# Patient Record
Sex: FEMALE | Race: WHITE | NOT HISPANIC OR LATINO | Employment: OTHER | URBAN - METROPOLITAN AREA
[De-identification: names, ages, dates, MRNs, and addresses within clinical notes are randomized per-mention and may not be internally consistent; named-entity substitution may affect disease eponyms.]

---

## 2019-01-24 ENCOUNTER — HOSPITAL ENCOUNTER (OUTPATIENT)
Dept: NUCLEAR MEDICINE | Facility: HOSPITAL | Age: 59
Discharge: HOME | End: 2019-01-24
Attending: FAMILY MEDICINE | Admitting: FAMILY MEDICINE

## 2019-07-19 ENCOUNTER — HOSPITAL ENCOUNTER (EMERGENCY)
Facility: HOSPITAL | Age: 59
Discharge: HOME | End: 2019-07-19
Admitting: EMERGENCY MEDICINE

## 2019-07-19 VITALS
HEART RATE: 58 BPM | OXYGEN SATURATION: 99 % | SYSTOLIC BLOOD PRESSURE: 163 MMHG | DIASTOLIC BLOOD PRESSURE: 82 MMHG | HEIGHT: 65 IN | BODY MASS INDEX: 31.74 KG/M2 | TEMPERATURE: 98.4 F | RESPIRATION RATE: 16 BRPM | WEIGHT: 190.48 LBS

## 2019-07-19 DIAGNOSIS — M54.32 SCIATICA OF LEFT SIDE: Primary | ICD-10-CM

## 2019-07-19 PROCEDURE — 25010000002 KETOROLAC TROMETHAMINE PER 15 MG: Performed by: PHYSICIAN ASSISTANT

## 2019-07-19 PROCEDURE — 99283 EMERGENCY DEPT VISIT LOW MDM: CPT

## 2019-07-19 PROCEDURE — 96372 THER/PROPH/DIAG INJ SC/IM: CPT

## 2019-07-19 PROCEDURE — 25010000002 METHYLPREDNISOLONE PER 125 MG: Performed by: PHYSICIAN ASSISTANT

## 2019-07-19 RX ORDER — LIDOCAINE 50 MG/G
1 PATCH TOPICAL ONCE
Status: COMPLETED | OUTPATIENT
Start: 2019-07-19 | End: 2019-07-19

## 2019-07-19 RX ORDER — KETOROLAC TROMETHAMINE 30 MG/ML
30 INJECTION, SOLUTION INTRAMUSCULAR; INTRAVENOUS ONCE
Status: COMPLETED | OUTPATIENT
Start: 2019-07-19 | End: 2019-07-19

## 2019-07-19 RX ORDER — DICLOFENAC SODIUM 75 MG/1
75 TABLET, DELAYED RELEASE ORAL 2 TIMES DAILY
Qty: 60 TABLET | Refills: 0 | Status: SHIPPED | OUTPATIENT
Start: 2019-07-19 | End: 2021-06-16 | Stop reason: SDUPTHER

## 2019-07-19 RX ORDER — LISINOPRIL AND HYDROCHLOROTHIAZIDE 20; 12.5 MG/1; MG/1
1 TABLET ORAL DAILY
COMMUNITY
End: 2021-06-16 | Stop reason: SDUPTHER

## 2019-07-19 RX ORDER — METHOCARBAMOL 750 MG/1
750 TABLET, FILM COATED ORAL 3 TIMES DAILY
Qty: 45 TABLET | Refills: 0 | Status: SHIPPED | OUTPATIENT
Start: 2019-07-19 | End: 2021-06-16 | Stop reason: SDUPTHER

## 2019-07-19 RX ORDER — ESOMEPRAZOLE MAGNESIUM 40 MG/1
40 CAPSULE, DELAYED RELEASE ORAL
COMMUNITY

## 2019-07-19 RX ORDER — ATORVASTATIN CALCIUM 20 MG/1
20 TABLET, FILM COATED ORAL DAILY
COMMUNITY
End: 2021-06-16 | Stop reason: SDUPTHER

## 2019-07-19 RX ORDER — BUSPIRONE HYDROCHLORIDE 10 MG/1
7.5 TABLET ORAL 2 TIMES DAILY
COMMUNITY
End: 2021-06-16 | Stop reason: SDUPTHER

## 2019-07-19 RX ORDER — CETIRIZINE HYDROCHLORIDE 10 MG/1
10 TABLET ORAL DAILY
COMMUNITY

## 2019-07-19 RX ORDER — METHOCARBAMOL 500 MG/1
500 TABLET, FILM COATED ORAL ONCE
Status: COMPLETED | OUTPATIENT
Start: 2019-07-19 | End: 2019-07-19

## 2019-07-19 RX ORDER — METHYLPREDNISOLONE 4 MG/1
TABLET ORAL
Qty: 21 TABLET | Refills: 0 | Status: SHIPPED | OUTPATIENT
Start: 2019-07-19 | End: 2021-06-16 | Stop reason: SDUPTHER

## 2019-07-19 RX ORDER — LIDOCAINE 50 MG/G
1 PATCH TOPICAL EVERY 24 HOURS
Qty: 30 PATCH | Refills: 0 | Status: SHIPPED | OUTPATIENT
Start: 2019-07-19 | End: 2021-06-16 | Stop reason: SDUPTHER

## 2019-07-19 RX ORDER — METHYLPREDNISOLONE SODIUM SUCCINATE 125 MG/2ML
125 INJECTION, POWDER, LYOPHILIZED, FOR SOLUTION INTRAMUSCULAR; INTRAVENOUS ONCE
Status: COMPLETED | OUTPATIENT
Start: 2019-07-19 | End: 2019-07-19

## 2019-07-19 RX ADMIN — METHOCARBAMOL 500 MG: 500 TABLET ORAL at 10:10

## 2019-07-19 RX ADMIN — LIDOCAINE 1 PATCH: 50 PATCH CUTANEOUS at 10:24

## 2019-07-19 RX ADMIN — LIDOCAINE 1 PATCH: 50 PATCH CUTANEOUS at 09:56

## 2019-07-19 RX ADMIN — KETOROLAC TROMETHAMINE 30 MG: 30 INJECTION, SOLUTION INTRAMUSCULAR at 10:10

## 2019-07-19 RX ADMIN — METHYLPREDNISOLONE SODIUM SUCCINATE 125 MG: 125 INJECTION, POWDER, FOR SOLUTION INTRAMUSCULAR; INTRAVENOUS at 10:10

## 2020-01-05 ENCOUNTER — HOSPITAL ENCOUNTER (EMERGENCY)
Facility: HOSPITAL | Age: 60
Discharge: HOME | End: 2020-01-05
Admitting: EMERGENCY MEDICINE

## 2020-01-05 VITALS
WEIGHT: 194 LBS | TEMPERATURE: 98 F | DIASTOLIC BLOOD PRESSURE: 86 MMHG | RESPIRATION RATE: 16 BRPM | BODY MASS INDEX: 32.32 KG/M2 | HEIGHT: 65 IN | OXYGEN SATURATION: 97 % | SYSTOLIC BLOOD PRESSURE: 153 MMHG | HEART RATE: 74 BPM

## 2020-01-05 DIAGNOSIS — L03.211 CELLULITIS OF FACE: Primary | ICD-10-CM

## 2020-01-05 PROCEDURE — 99282 EMERGENCY DEPT VISIT SF MDM: CPT

## 2020-01-05 RX ORDER — CEPHALEXIN 500 MG/1
500 CAPSULE ORAL 3 TIMES DAILY
Qty: 30 CAPSULE | Refills: 0 | Status: SHIPPED | OUTPATIENT
Start: 2020-01-05 | End: 2021-06-16 | Stop reason: SDUPTHER

## 2020-01-05 NOTE — ED PROVIDER NOTES
"Subjective   59-year-old female presents with \"bug bite\" to her right lower chin and below her right ear.  She denies fever sweats or chills.  Denies history of diabetes.  Denies recent antibiotic use.  She stated \"I had put my pillows on the floor while is make my bed and then put them behind me to watch something on TV and then they showed up yesterday\"    1. Location: R chin, R upper jaw  2. Quality: sore  3. Severity: mild  4. Worsening factors: scratching  5. Alleviating factors: not scratching  6. Onset: yesterday  7. Radiation: denies  8. Frequency: constant  9. Co-morbidities: asthma, DDD, HTN, scoliosis  10. Source: patient            Review of Systems   Constitutional: Negative for chills, diaphoresis and fever.   HENT: Negative for congestion, drooling, ear discharge, rhinorrhea, sneezing, sore throat, trouble swallowing and voice change.    Eyes: Negative for redness.   Respiratory: Negative for cough, choking, chest tightness, shortness of breath, wheezing and stridor.    Gastrointestinal: Negative for abdominal pain, nausea and vomiting.   Musculoskeletal: Negative for arthralgias, joint swelling, myalgias and neck pain.   Skin: Positive for rash. Negative for color change, pallor and wound.   Allergic/Immunologic: Negative for environmental allergies, food allergies and immunocompromised state.   Neurological: Negative for headaches.   Hematological: Negative for adenopathy.   All other systems reviewed and are negative.      Past Medical History:   Diagnosis Date   • Asthma    • DDD (degenerative disc disease), lumbar    • Hypertension    • Scoliosis    • Sleep apnea        Allergies   Allergen Reactions   • Iodine Anaphylaxis   • Bactrim [Sulfamethoxazole-Trimethoprim] Unknown - Low Severity   • Beeswax Swelling   • Sulfa Antibiotics Swelling       Past Surgical History:   Procedure Laterality Date   • EXPLORATORY LAPAROTOMY     • EYE SURGERY     • HYSTERECTOMY         No family history on " file.    Social History     Socioeconomic History   • Marital status:      Spouse name: Not on file   • Number of children: Not on file   • Years of education: Not on file   • Highest education level: Not on file   Tobacco Use   • Smoking status: Current Every Day Smoker     Packs/day: 0.50     Types: Cigarettes   Substance and Sexual Activity   • Alcohol use: Yes     Frequency: Never     Comment: occasional   • Sexual activity: Never           Objective   Physical Exam   Constitutional: She is oriented to person, place, and time. Vital signs are normal. She appears well-developed and well-nourished. She is active and cooperative.  Non-toxic appearance. No distress.   HENT:   Head: Normocephalic and atraumatic.       Right Ear: Hearing, tympanic membrane, external ear and ear canal normal.   Left Ear: Hearing, tympanic membrane, external ear and ear canal normal.   Nose: Nose normal.   Mouth/Throat: Uvula is midline, oropharynx is clear and moist and mucous membranes are normal. Tonsils are 1+ on the right. Tonsils are 1+ on the left. No tonsillar exudate.   Neck: Trachea normal, normal range of motion, full passive range of motion without pain and phonation normal. Neck supple. No Brudzinski's sign and no Kernig's sign noted.   Cardiovascular: Normal rate, regular rhythm, normal heart sounds and intact distal pulses. Exam reveals no gallop and no friction rub.   No murmur heard.  Pulmonary/Chest: Effort normal and breath sounds normal. No respiratory distress. She has no wheezes.   Lymphadenopathy:     She has no cervical adenopathy.   Neurological: She is alert and oriented to person, place, and time.   Skin: Skin is warm, dry and intact. Capillary refill takes less than 2 seconds. Lesion and rash noted. Rash is maculopapular.   Psychiatric: She has a normal mood and affect. Her behavior is normal. Judgment and thought content normal.   Nursing note and vitals reviewed.      Procedures           ED Course    "   No radiology results for the last day  Medications - No data to display  Labs Reviewed - No data to display                                           MDM  Number of Diagnoses or Management Options  Cellulitis of face:   Diagnosis management comments: Chart Review: 12/13/2019 patient was seen by spine for follow-up on her DDD.  Comorbidity: asthma, DDD, HTN, scoliosis  Imaging: Not warranted.  Disposition/Treatment: Discussed results with patient, verbalized understanding.  Agreeable with plan of care.    Patient undressed and placed in gown for exam. 59-year-old female presents with \"bug bite\" to her right lower chin and below her right ear.  She denies fever sweats or chills.  Denies history of diabetes.  Denies recent antibiotic use.  She stated \"I had put my pillows on the floor while is make my bed and then put them behind me to watch something on TV and then they showed up yesterday\".  Patient is nontoxic.  She is discharged home with prescription for Keflex and given follow-up with her PCP in 3 days for wound recheck.  She is instructed to apply heat every 2 hours while awake and cleanse the area twice daily with antibacterial soap and water then apply bacitracin.  She is encouraged to return to the ER for any new or worsening symptoms.        Patient Progress  Patient progress: stable      Final diagnoses:   Cellulitis of face            Janis Black NP  01/05/20 0756    "

## 2020-01-05 NOTE — DISCHARGE INSTRUCTIONS
Take antibiotics as prescribed with food.  Cleanse twice daily with antibacterial soap and water, then apply bacitracin.  Warm compresses every 2 hours while awake, on for 20 minutes.  Okay to be open to air.  Follow-up with PCP in 3 days for wound check.  Return to the ER for any new or worsening symptoms.

## 2020-03-07 ENCOUNTER — HOSPITAL ENCOUNTER (EMERGENCY)
Facility: HOSPITAL | Age: 60
Discharge: HOME | End: 2020-03-07
Admitting: EMERGENCY MEDICINE

## 2020-03-07 ENCOUNTER — APPOINTMENT (OUTPATIENT)
Dept: GENERAL RADIOLOGY | Facility: HOSPITAL | Age: 60
End: 2020-03-07

## 2020-03-07 VITALS
OXYGEN SATURATION: 95 % | TEMPERATURE: 99.3 F | BODY MASS INDEX: 31.92 KG/M2 | RESPIRATION RATE: 19 BRPM | SYSTOLIC BLOOD PRESSURE: 104 MMHG | HEART RATE: 80 BPM | HEIGHT: 65 IN | DIASTOLIC BLOOD PRESSURE: 48 MMHG | WEIGHT: 191.58 LBS

## 2020-03-07 DIAGNOSIS — J20.9 ACUTE BRONCHITIS, UNSPECIFIED ORGANISM: Primary | ICD-10-CM

## 2020-03-07 DIAGNOSIS — R06.00 DYSPNEA, UNSPECIFIED TYPE: ICD-10-CM

## 2020-03-07 LAB
ANION GAP SERPL CALCULATED.3IONS-SCNC: 12 MMOL/L (ref 5–15)
BUN BLD-MCNC: 20 MG/DL (ref 6–20)
BUN/CREAT SERPL: 26 (ref 7–25)
CALCIUM SPEC-SCNC: 9.5 MG/DL (ref 8.6–10.5)
CHLORIDE SERPL-SCNC: 100 MMOL/L (ref 98–107)
CO2 SERPL-SCNC: 26 MMOL/L (ref 22–29)
CREAT BLD-MCNC: 0.77 MG/DL (ref 0.57–1)
DEPRECATED RDW RBC AUTO: 45.1 FL (ref 37–54)
EOSINOPHIL # BLD MANUAL: 0.17 10*3/MM3 (ref 0–0.4)
EOSINOPHIL NFR BLD MANUAL: 2 % (ref 0.3–6.2)
ERYTHROCYTE [DISTWIDTH] IN BLOOD BY AUTOMATED COUNT: 14.1 % (ref 12.3–15.4)
FLUAV SUBTYP SPEC NAA+PROBE: NOT DETECTED
FLUBV RNA ISLT QL NAA+PROBE: NOT DETECTED
GFR SERPL CREATININE-BSD FRML MDRD: 77 ML/MIN/1.73
GLUCOSE BLD-MCNC: 130 MG/DL (ref 65–99)
HCT VFR BLD AUTO: 38.3 % (ref 34–46.6)
HGB BLD-MCNC: 13.1 G/DL (ref 12–15.9)
LYMPHOCYTES # BLD MANUAL: 1 10*3/MM3 (ref 0.7–3.1)
LYMPHOCYTES NFR BLD MANUAL: 12 % (ref 19.6–45.3)
LYMPHOCYTES NFR BLD MANUAL: 6 % (ref 5–12)
MCH RBC QN AUTO: 30.6 PG (ref 26.6–33)
MCHC RBC AUTO-ENTMCNC: 34.1 G/DL (ref 31.5–35.7)
MCV RBC AUTO: 89.7 FL (ref 79–97)
MONOCYTES # BLD AUTO: 0.5 10*3/MM3 (ref 0.1–0.9)
NEUTROPHILS # BLD AUTO: 6.64 10*3/MM3 (ref 1.7–7)
NEUTROPHILS NFR BLD MANUAL: 72 % (ref 42.7–76)
NEUTS BAND NFR BLD MANUAL: 8 % (ref 0–5)
NEUTS VAC BLD QL SMEAR: ABNORMAL
PLAT MORPH BLD: NORMAL
PLATELET # BLD AUTO: 244 10*3/MM3 (ref 140–450)
PMV BLD AUTO: 8.8 FL (ref 6–12)
POTASSIUM BLD-SCNC: 3.6 MMOL/L (ref 3.5–5.2)
RBC # BLD AUTO: 4.27 10*6/MM3 (ref 3.77–5.28)
RBC MORPH BLD: NORMAL
SCAN SLIDE: NORMAL
SODIUM BLD-SCNC: 138 MMOL/L (ref 136–145)
TROPONIN T SERPL-MCNC: <0.01 NG/ML (ref 0–0.03)
WBC NRBC COR # BLD: 8.3 10*3/MM3 (ref 3.4–10.8)

## 2020-03-07 PROCEDURE — 85025 COMPLETE CBC W/AUTO DIFF WBC: CPT | Performed by: NURSE PRACTITIONER

## 2020-03-07 PROCEDURE — 93005 ELECTROCARDIOGRAM TRACING: CPT

## 2020-03-07 PROCEDURE — 85007 BL SMEAR W/DIFF WBC COUNT: CPT | Performed by: NURSE PRACTITIONER

## 2020-03-07 PROCEDURE — 80048 BASIC METABOLIC PNL TOTAL CA: CPT | Performed by: NURSE PRACTITIONER

## 2020-03-07 PROCEDURE — 71045 X-RAY EXAM CHEST 1 VIEW: CPT

## 2020-03-07 PROCEDURE — 84484 ASSAY OF TROPONIN QUANT: CPT | Performed by: NURSE PRACTITIONER

## 2020-03-07 PROCEDURE — 99284 EMERGENCY DEPT VISIT MOD MDM: CPT

## 2020-03-07 PROCEDURE — 87502 INFLUENZA DNA AMP PROBE: CPT | Performed by: NURSE PRACTITIONER

## 2020-03-07 RX ORDER — SODIUM CHLORIDE 0.9 % (FLUSH) 0.9 %
10 SYRINGE (ML) INJECTION AS NEEDED
Status: DISCONTINUED | OUTPATIENT
Start: 2020-03-07 | End: 2020-03-07 | Stop reason: HOSPADM

## 2020-03-07 RX ORDER — METHYLPREDNISOLONE 4 MG/1
TABLET ORAL
Qty: 21 TABLET | Refills: 0 | Status: SHIPPED | OUTPATIENT
Start: 2020-03-07 | End: 2021-06-16 | Stop reason: SDUPTHER

## 2020-03-07 RX ORDER — BENZONATATE 200 MG/1
200 CAPSULE ORAL 3 TIMES DAILY PRN
Qty: 15 CAPSULE | Refills: 0 | Status: SHIPPED | OUTPATIENT
Start: 2020-03-07 | End: 2021-06-16 | Stop reason: SDUPTHER

## 2020-03-07 RX ORDER — ALBUTEROL SULFATE 2.5 MG/3ML
2.5 SOLUTION RESPIRATORY (INHALATION) EVERY 4 HOURS PRN
Qty: 3 ML | Refills: 0 | Status: SHIPPED | OUTPATIENT
Start: 2020-03-07 | End: 2021-06-16 | Stop reason: SDUPTHER

## 2020-03-07 NOTE — ED PROVIDER NOTES
Subjective   Patient is a 60-year-old white female with history of asthma who presents today with complaints of nonproductive cough, sore throat, nasal congestion and chest tightness.  States she is also running low-grade fever this morning.  She denies any chills or body aches.  She states her symptoms started yesterday morning. She reports her cough to be hacking and nonproductive.  States the cough is causing her to feel some short of breath.  She denies any chest pain.  She denies any nausea vomiting or diarrhea.  She denies any lower extremity pain or edema. She denies any ill contacts or recent travel.          Review of Systems   Constitutional: Positive for fever. Negative for chills.   HENT: Positive for congestion, postnasal drip and sore throat.    Respiratory: Positive for cough, chest tightness and shortness of breath.    Cardiovascular: Negative for chest pain.   Gastrointestinal: Negative for abdominal pain, diarrhea, nausea and vomiting.   Skin: Negative for rash.       Past Medical History:   Diagnosis Date   • Asthma    • DDD (degenerative disc disease), lumbar    • Hypertension    • Scoliosis    • Sleep apnea        Allergies   Allergen Reactions   • Iodine Anaphylaxis   • Bactrim [Sulfamethoxazole-Trimethoprim] Unknown - Low Severity   • Beeswax Swelling   • Sulfa Antibiotics Swelling       Past Surgical History:   Procedure Laterality Date   • EXPLORATORY LAPAROTOMY     • EYE SURGERY     • HYSTERECTOMY         No family history on file.    Social History     Socioeconomic History   • Marital status:      Spouse name: Not on file   • Number of children: Not on file   • Years of education: Not on file   • Highest education level: Not on file   Tobacco Use   • Smoking status: Current Every Day Smoker     Packs/day: 0.50     Types: Cigarettes   Substance and Sexual Activity   • Alcohol use: Yes     Frequency: Never     Comment: occasional   • Sexual activity: Never           Objective    Physical Exam   Constitutional: She appears well-developed.   Vital signs and triage nurse note reviewed.  Constitutional: Awake, alert; well-developed and well-nourished. No acute distress is noted.  HEENT: Normocephalic, atraumatic; pupils are PERRL with intact EOM; oropharynx is pink and moist without exudate or erythema.  No drooling or pooling of oral secretions.  Neck: Supple, full range of motion without pain; no cervical lymphadenopathy. Normal phonation.  Cardiovascular: Regular rate and rhythm, normal S1-S2.  No murmur noted.  Pulmonary: Respiratory effort regular nonlabored, breath sounds clear to auscultation all fields.  Abdomen: Soft, nontender, nondistended with normoactive bowel sounds; no rebound or guarding.  Calves are symmetric and nontender.  Musculoskeletal: Independent range of motion of all extremities with no palpable tenderness or edema.  Neuro: Alert oriented x3, speech is clear and appropriate, GCS 15.    Skin: Flesh tone, warm, dry, intact; no erythematous or petechial rash or lesion.        Procedures           ED Course  ED Course as of Mar 07 0837   Sat Mar 07, 2020   0730 EKG reviewed by me and interpreted by Dr. Curtis: Sinus rhythm with ventricular rate of 84.  No acute ST or T wave changes noted.  No ectopy.  No significant change from prior EKG on 8/24/2018.    [MD]      ED Course User Index  [MD] Amy Hernandez APRN      Labs Reviewed   BASIC METABOLIC PANEL - Abnormal; Notable for the following components:       Result Value    Glucose 130 (*)     BUN/Creatinine Ratio 26.0 (*)     All other components within normal limits    Narrative:     GFR Normal >60  Chronic Kidney Disease <60  Kidney Failure <15     MANUAL DIFFERENTIAL - Abnormal; Notable for the following components:    Lymphocyte % 12.0 (*)     Bands %  8.0 (*)     All other components within normal limits   INFLUENZA ANTIGEN, RAPID - Normal   TROPONIN (IN-HOUSE) - Normal    Narrative:     Troponin T Reference  Range:  <= 0.03 ng/mL-   Negative for AMI  >0.03 ng/mL-     Abnormal for myocardial necrosis.  Clinicians would have to utilize clinical acumen, EKG, Troponin and serial changes to determine if it is an Acute Myocardial Infarction or myocardial injury due to an underlying chronic condition.       Results may be falsely decreased if patient taking Biotin.     CBC WITH AUTO DIFFERENTIAL - Normal   SCAN SLIDE   CBC AND DIFFERENTIAL    Narrative:     The following orders were created for panel order CBC & Differential.  Procedure                               Abnormality         Status                     ---------                               -----------         ------                     CBC Auto Differential[[id number]]        Normal              Final result                 Please view results for these tests on the individual orders.     Xr Chest 1 View    Result Date: 3/7/2020   1. No acute cardiopulmonary disease.   Electronically Signed By-Mario Wallis On:3/7/2020 8:10 AM This report was finalized on [id number] by  Mario Wallis, .    Medications   sodium chloride 0.9 % flush 10 mL (has no administration in time range)                                          MDM  Number of Diagnoses or Management Options  Diagnosis management comments: Comorbidities: Asthma  Differentials: Asthma exacerbation, bronchitis, influenza, pneumonia, viral illness;this list is not all inclusive and does not constitute the entirety of considered causes  Discussion with provider:  Radiology interpretation: X-rays reviewed by me and interpreted by radiologist: As above  Lab interpretation: Labs viewed by me significant for: As above    Patient had an IV established.  She is placed on continuous cardiac monitor.  She had labs, EKG and chest x-ray obtained.  On reexamination, patient is resting comfortably no acute distress.  Denies any new complaints at this time.  She remains well-appearing and in no respiratory distress.  Her vital  signs are stable.  Her sounds are clear and equal bilaterally.    Diagnosis and treatment plan discussed with patient.  Patient agreeable to plan.   I discussed findings with patient who voices understanding of discharge instructions, signs and symptoms requiring return to ED; discharged improved and in stable condition with follow up for re-evaluation.  Prescription for Medrol Dosepak, Tessalon, albuterol Nebules.       Amount and/or Complexity of Data Reviewed  Clinical lab tests: ordered and reviewed  Tests in the radiology section of CPT®: ordered and reviewed    Patient Progress  Patient progress: stable      Final diagnoses:   Acute bronchitis, unspecified organism   Dyspnea, unspecified type            Amy Hernandez, TANESHA  03/07/20 0837

## 2020-03-07 NOTE — ED NOTES
Pt reports hx of asthma started with a cough and chest tightness yesterday, denies any other s/sx pt is 88% on roomair, pt placed on 2L of oxygen and is now above 94%     Chantal Steele RN  03/07/20 7889

## 2020-03-07 NOTE — DISCHARGE INSTRUCTIONS
Take medications as prescribed.  Drink plenty fluids.  May use Mucinex for congestion.  Follow-up with your primary care physician as needed.  Return for new or worsening symptoms.

## 2020-03-07 NOTE — ED NOTES
Pt is tolerating 2L of oxygen well, I attempted iv twice was unable to obtain both tube, Farhana SNYDER at bedside now attempting.      Chantal Steele, RN  03/07/20 6737

## 2022-02-15 ENCOUNTER — APPOINTMENT (OUTPATIENT)
Dept: GENERAL RADIOLOGY | Facility: HOSPITAL | Age: 62
End: 2022-02-15

## 2022-02-15 ENCOUNTER — APPOINTMENT (OUTPATIENT)
Dept: CARDIOLOGY | Facility: HOSPITAL | Age: 62
End: 2022-02-15

## 2022-02-15 ENCOUNTER — HOSPITAL ENCOUNTER (OUTPATIENT)
Facility: HOSPITAL | Age: 62
Setting detail: OBSERVATION
Discharge: HOME | End: 2022-02-16
Attending: EMERGENCY MEDICINE | Admitting: EMERGENCY MEDICINE

## 2022-02-15 DIAGNOSIS — F41.9 ANXIETY: ICD-10-CM

## 2022-02-15 DIAGNOSIS — R07.9 CHEST PAIN, UNSPECIFIED TYPE: Primary | ICD-10-CM

## 2022-02-15 LAB
ALBUMIN SERPL-MCNC: 4.4 G/DL (ref 3.5–5.2)
ALBUMIN/GLOB SERPL: 1.8 G/DL
ALP SERPL-CCNC: 75 U/L (ref 39–117)
ALT SERPL W P-5'-P-CCNC: 37 U/L (ref 1–33)
ANION GAP SERPL CALCULATED.3IONS-SCNC: 12 MMOL/L (ref 5–15)
APTT PPP: 24.7 SECONDS (ref 24–31)
AST SERPL-CCNC: 22 U/L (ref 1–32)
BASOPHILS # BLD AUTO: 0.1 10*3/MM3 (ref 0–0.2)
BASOPHILS NFR BLD AUTO: 1.3 % (ref 0–1.5)
BH CV UPPER VENOUS LEFT AXILLARY AUGMENT: NORMAL
BH CV UPPER VENOUS LEFT AXILLARY COMPETENT: NORMAL
BH CV UPPER VENOUS LEFT AXILLARY COMPRESS: NORMAL
BH CV UPPER VENOUS LEFT AXILLARY PHASIC: NORMAL
BH CV UPPER VENOUS LEFT AXILLARY SPONT: NORMAL
BH CV UPPER VENOUS LEFT BASILIC FOREARM COMPRESS: NORMAL
BH CV UPPER VENOUS LEFT BASILIC UPPER COMPRESS: NORMAL
BH CV UPPER VENOUS LEFT BRACHIAL COMPRESS: NORMAL
BH CV UPPER VENOUS LEFT CEPHALIC FOREARM COMPRESS: NORMAL
BH CV UPPER VENOUS LEFT CEPHALIC UPPER COMPRESS: NORMAL
BH CV UPPER VENOUS LEFT INTERNAL JUGULAR AUGMENT: NORMAL
BH CV UPPER VENOUS LEFT INTERNAL JUGULAR COMPETENT: NORMAL
BH CV UPPER VENOUS LEFT INTERNAL JUGULAR COMPRESS: NORMAL
BH CV UPPER VENOUS LEFT INTERNAL JUGULAR PHASIC: NORMAL
BH CV UPPER VENOUS LEFT INTERNAL JUGULAR SPONT: NORMAL
BH CV UPPER VENOUS LEFT RADIAL COMPRESS: NORMAL
BH CV UPPER VENOUS LEFT SUBCLAVIAN AUGMENT: NORMAL
BH CV UPPER VENOUS LEFT SUBCLAVIAN COMPETENT: NORMAL
BH CV UPPER VENOUS LEFT SUBCLAVIAN COMPRESS: NORMAL
BH CV UPPER VENOUS LEFT SUBCLAVIAN PHASIC: NORMAL
BH CV UPPER VENOUS LEFT SUBCLAVIAN SPONT: NORMAL
BH CV UPPER VENOUS LEFT ULNAR COMPRESS: NORMAL
BH CV UPPER VENOUS RIGHT INTERNAL JUGULAR AUGMENT: NORMAL
BH CV UPPER VENOUS RIGHT INTERNAL JUGULAR COMPETENT: NORMAL
BH CV UPPER VENOUS RIGHT INTERNAL JUGULAR COMPRESS: NORMAL
BH CV UPPER VENOUS RIGHT INTERNAL JUGULAR PHASIC: NORMAL
BH CV UPPER VENOUS RIGHT INTERNAL JUGULAR SPONT: NORMAL
BH CV UPPER VENOUS RIGHT SUBCLAVIAN AUGMENT: NORMAL
BH CV UPPER VENOUS RIGHT SUBCLAVIAN COMPETENT: NORMAL
BH CV UPPER VENOUS RIGHT SUBCLAVIAN COMPRESS: NORMAL
BH CV UPPER VENOUS RIGHT SUBCLAVIAN PHASIC: NORMAL
BH CV UPPER VENOUS RIGHT SUBCLAVIAN SPONT: NORMAL
BILIRUB SERPL-MCNC: 0.5 MG/DL (ref 0–1.2)
BUN SERPL-MCNC: 8 MG/DL (ref 8–23)
BUN/CREAT SERPL: 11.8 (ref 7–25)
CALCIUM SPEC-SCNC: 9.4 MG/DL (ref 8.6–10.5)
CHLORIDE SERPL-SCNC: 97 MMOL/L (ref 98–107)
CO2 SERPL-SCNC: 26 MMOL/L (ref 22–29)
CREAT SERPL-MCNC: 0.68 MG/DL (ref 0.57–1)
DEPRECATED RDW RBC AUTO: 40.7 FL (ref 37–54)
EOSINOPHIL # BLD AUTO: 0.1 10*3/MM3 (ref 0–0.4)
EOSINOPHIL NFR BLD AUTO: 0.8 % (ref 0.3–6.2)
ERYTHROCYTE [DISTWIDTH] IN BLOOD BY AUTOMATED COUNT: 13.1 % (ref 12.3–15.4)
GFR SERPL CREATININE-BSD FRML MDRD: 88 ML/MIN/1.73
GLOBULIN UR ELPH-MCNC: 2.5 GM/DL
GLUCOSE SERPL-MCNC: 114 MG/DL (ref 65–99)
HCT VFR BLD AUTO: 37.9 % (ref 34–46.6)
HGB BLD-MCNC: 13.1 G/DL (ref 12–15.9)
INR PPP: 0.97 (ref 0.93–1.1)
LYMPHOCYTES # BLD AUTO: 2.1 10*3/MM3 (ref 0.7–3.1)
LYMPHOCYTES NFR BLD AUTO: 18.5 % (ref 19.6–45.3)
MAXIMAL PREDICTED HEART RATE: 160 BPM
MCH RBC QN AUTO: 30.4 PG (ref 26.6–33)
MCHC RBC AUTO-ENTMCNC: 34.5 G/DL (ref 31.5–35.7)
MCV RBC AUTO: 88.1 FL (ref 79–97)
MONOCYTES # BLD AUTO: 0.7 10*3/MM3 (ref 0.1–0.9)
MONOCYTES NFR BLD AUTO: 5.9 % (ref 5–12)
NEUTROPHILS NFR BLD AUTO: 73.5 % (ref 42.7–76)
NEUTROPHILS NFR BLD AUTO: 8.3 10*3/MM3 (ref 1.7–7)
NRBC BLD AUTO-RTO: 0 /100 WBC (ref 0–0.2)
PLATELET # BLD AUTO: 350 10*3/MM3 (ref 140–450)
PMV BLD AUTO: 7.9 FL (ref 6–12)
POTASSIUM SERPL-SCNC: 4.2 MMOL/L (ref 3.5–5.2)
PROT SERPL-MCNC: 6.9 G/DL (ref 6–8.5)
PROTHROMBIN TIME: 10.8 SECONDS (ref 9.6–11.7)
RBC # BLD AUTO: 4.3 10*6/MM3 (ref 3.77–5.28)
SARS-COV-2 RNA PNL SPEC NAA+PROBE: DETECTED
SODIUM SERPL-SCNC: 135 MMOL/L (ref 136–145)
STRESS TARGET HR: 136 BPM
TROPONIN T SERPL-MCNC: <0.01 NG/ML (ref 0–0.03)
TROPONIN T SERPL-MCNC: <0.01 NG/ML (ref 0–0.03)
WBC NRBC COR # BLD: 11.3 10*3/MM3 (ref 3.4–10.8)

## 2022-02-15 PROCEDURE — G0378 HOSPITAL OBSERVATION PER HR: HCPCS

## 2022-02-15 PROCEDURE — 85610 PROTHROMBIN TIME: CPT | Performed by: EMERGENCY MEDICINE

## 2022-02-15 PROCEDURE — 87635 SARS-COV-2 COVID-19 AMP PRB: CPT | Performed by: EMERGENCY MEDICINE

## 2022-02-15 PROCEDURE — 99284 EMERGENCY DEPT VISIT MOD MDM: CPT

## 2022-02-15 PROCEDURE — 93005 ELECTROCARDIOGRAM TRACING: CPT

## 2022-02-15 PROCEDURE — 80053 COMPREHEN METABOLIC PANEL: CPT | Performed by: EMERGENCY MEDICINE

## 2022-02-15 PROCEDURE — 71045 X-RAY EXAM CHEST 1 VIEW: CPT

## 2022-02-15 PROCEDURE — 93971 EXTREMITY STUDY: CPT

## 2022-02-15 PROCEDURE — 85025 COMPLETE CBC W/AUTO DIFF WBC: CPT | Performed by: EMERGENCY MEDICINE

## 2022-02-15 PROCEDURE — 84484 ASSAY OF TROPONIN QUANT: CPT | Performed by: EMERGENCY MEDICINE

## 2022-02-15 PROCEDURE — 93005 ELECTROCARDIOGRAM TRACING: CPT | Performed by: EMERGENCY MEDICINE

## 2022-02-15 PROCEDURE — 84484 ASSAY OF TROPONIN QUANT: CPT | Performed by: NURSE PRACTITIONER

## 2022-02-15 PROCEDURE — 85730 THROMBOPLASTIN TIME PARTIAL: CPT | Performed by: EMERGENCY MEDICINE

## 2022-02-15 PROCEDURE — 36415 COLL VENOUS BLD VENIPUNCTURE: CPT | Performed by: NURSE PRACTITIONER

## 2022-02-15 PROCEDURE — C9803 HOPD COVID-19 SPEC COLLECT: HCPCS

## 2022-02-15 RX ORDER — ACETAMINOPHEN 325 MG/1
650 TABLET ORAL EVERY 4 HOURS PRN
Status: DISCONTINUED | OUTPATIENT
Start: 2022-02-15 | End: 2022-02-16 | Stop reason: HOSPADM

## 2022-02-15 RX ORDER — PANTOPRAZOLE SODIUM 40 MG/1
40 TABLET, DELAYED RELEASE ORAL
Status: DISCONTINUED | OUTPATIENT
Start: 2022-02-16 | End: 2022-02-16 | Stop reason: HOSPADM

## 2022-02-15 RX ORDER — ONDANSETRON 2 MG/ML
4 INJECTION INTRAMUSCULAR; INTRAVENOUS EVERY 6 HOURS PRN
Status: DISCONTINUED | OUTPATIENT
Start: 2022-02-15 | End: 2022-02-16 | Stop reason: HOSPADM

## 2022-02-15 RX ORDER — FLUOXETINE HYDROCHLORIDE 20 MG/1
20 CAPSULE ORAL NIGHTLY
Status: DISCONTINUED | OUTPATIENT
Start: 2022-02-15 | End: 2022-02-16 | Stop reason: HOSPADM

## 2022-02-15 RX ORDER — LORAZEPAM 0.5 MG/1
0.5 TABLET ORAL EVERY 6 HOURS PRN
Status: DISCONTINUED | OUTPATIENT
Start: 2022-02-15 | End: 2022-02-15

## 2022-02-15 RX ORDER — SODIUM CHLORIDE 0.9 % (FLUSH) 0.9 %
10 SYRINGE (ML) INJECTION AS NEEDED
Status: DISCONTINUED | OUTPATIENT
Start: 2022-02-15 | End: 2022-02-16 | Stop reason: HOSPADM

## 2022-02-15 RX ORDER — LORAZEPAM 0.5 MG/1
0.5 TABLET ORAL ONCE
Status: COMPLETED | OUTPATIENT
Start: 2022-02-15 | End: 2022-02-15

## 2022-02-15 RX ORDER — ATORVASTATIN CALCIUM 40 MG/1
40 TABLET, FILM COATED ORAL DAILY
Status: DISCONTINUED | OUTPATIENT
Start: 2022-02-16 | End: 2022-02-16 | Stop reason: HOSPADM

## 2022-02-15 RX ORDER — ACETAMINOPHEN 650 MG/1
650 SUPPOSITORY RECTAL EVERY 4 HOURS PRN
Status: DISCONTINUED | OUTPATIENT
Start: 2022-02-15 | End: 2022-02-16 | Stop reason: HOSPADM

## 2022-02-15 RX ORDER — BUSPIRONE HYDROCHLORIDE 15 MG/1
15 TABLET ORAL EVERY 12 HOURS SCHEDULED
Status: DISCONTINUED | OUTPATIENT
Start: 2022-02-15 | End: 2022-02-16 | Stop reason: HOSPADM

## 2022-02-15 RX ORDER — LISINOPRIL 20 MG/1
40 TABLET ORAL
Status: DISCONTINUED | OUTPATIENT
Start: 2022-02-16 | End: 2022-02-16 | Stop reason: HOSPADM

## 2022-02-15 RX ORDER — ASPIRIN 81 MG/1
324 TABLET, CHEWABLE ORAL ONCE
Status: COMPLETED | OUTPATIENT
Start: 2022-02-15 | End: 2022-02-15

## 2022-02-15 RX ORDER — ONDANSETRON 4 MG/1
4 TABLET, FILM COATED ORAL EVERY 6 HOURS PRN
Status: DISCONTINUED | OUTPATIENT
Start: 2022-02-15 | End: 2022-02-16 | Stop reason: HOSPADM

## 2022-02-15 RX ORDER — FLUOXETINE HYDROCHLORIDE 20 MG/1
20 CAPSULE ORAL
COMMUNITY

## 2022-02-15 RX ORDER — CETIRIZINE HYDROCHLORIDE 10 MG/1
10 TABLET ORAL DAILY
Status: DISCONTINUED | OUTPATIENT
Start: 2022-02-16 | End: 2022-02-16 | Stop reason: HOSPADM

## 2022-02-15 RX ORDER — SODIUM CHLORIDE 0.9 % (FLUSH) 0.9 %
10 SYRINGE (ML) INJECTION EVERY 12 HOURS SCHEDULED
Status: DISCONTINUED | OUTPATIENT
Start: 2022-02-15 | End: 2022-02-16 | Stop reason: HOSPADM

## 2022-02-15 RX ORDER — ACETAMINOPHEN 160 MG/5ML
650 SOLUTION ORAL EVERY 4 HOURS PRN
Status: DISCONTINUED | OUTPATIENT
Start: 2022-02-15 | End: 2022-02-16 | Stop reason: HOSPADM

## 2022-02-15 RX ADMIN — LORAZEPAM 0.5 MG: 0.5 TABLET ORAL at 11:47

## 2022-02-15 RX ADMIN — FLUOXETINE 20 MG: 20 CAPSULE ORAL at 20:11

## 2022-02-15 RX ADMIN — SODIUM CHLORIDE, PRESERVATIVE FREE 10 ML: 5 INJECTION INTRAVENOUS at 20:12

## 2022-02-15 RX ADMIN — ASPIRIN 81 MG CHEWABLE TABLET 324 MG: 81 TABLET CHEWABLE at 14:05

## 2022-02-15 RX ADMIN — BUSPIRONE HYDROCHLORIDE 15 MG: 15 TABLET ORAL at 21:54

## 2022-02-15 NOTE — ED NOTES
PT c/o anxiety that has been increasing since last night. Pt states she is having some left arm pain into her shoulder. Pt denies any chest pain or soa. Pt is alert and oriented. Pt is tearful and anxious. Care explained and questions answered.      Farhana Thakur, RN  02/15/22 9731

## 2022-02-15 NOTE — ED NOTES
"Pt reports to triage window stating \"I'm having an anxiety attack\" vitals reassesed-WNL per patient. Pt felt better after vitals rechecked.     Mary Ellen Francisco, RN  02/15/22 4754    "

## 2022-02-15 NOTE — PLAN OF CARE
Problem: Adult Inpatient Plan of Care  Goal: Plan of Care Review  Outcome: Ongoing, Progressing  Goal: Patient-Specific Goal (Individualized)  Outcome: Ongoing, Progressing  Goal: Absence of Hospital-Acquired Illness or Injury  Outcome: Ongoing, Progressing  Goal: Optimal Comfort and Wellbeing  Outcome: Ongoing, Progressing  Goal: Readiness for Transition of Care  Outcome: Ongoing, Progressing  Intervention: Mutually Develop Transition Plan  Recent Flowsheet Documentation  Taken 2/15/2022 1724 by Hanane Weston, RN  Transportation Anticipated: (pt states no one will want to come get her) other (see comments)  Patient/Family Anticipated Services at Transition: none  Patient/Family Anticipates Transition to: home with family  Taken 2/15/2022 1721 by Hanane Weston, RN  Equipment Currently Used at Home: cpap     Problem: Chest Pain  Goal: Resolution of Chest Pain Symptoms  Outcome: Ongoing, Progressing   Goal Outcome Evaluation:

## 2022-02-15 NOTE — H&P
[facility] H&P    Patient Name: Nanda Borjas  : 1960  MRN: [id number]  Primary Care Physician: Radha Basilio APRN  Date of admission: 2/15/2022     Patient Care Team:  Radha Basilio APRN as PCP - General (Nurse Practitioner)          Subjective   History Present Illness     Chief Complaint:   Chief Complaint   Patient presents with   • Hypertension         Ms. Borjas is a 62 y.o.  presents to [facility] complaining of left arm pain.       62 year old female presents to ER with a chief complaint of left arm pain, chest pain equivalent, which began as she was at rest yesterday p.m.  She denies shortness of breath with exertion.  She denies nausea or diaphoresis. The patient and her  have had recent upper respiratory illness and her  was positive for Covid 19.        Review of Systems   Constitutional: Negative for chills and fever.   Cardiovascular: Positive for chest pain. Negative for dyspnea on exertion.        Chest pain equivalent    All other systems reviewed and are negative.          Personal History     Past Medical History:   Past Medical History:   Diagnosis Date   • Asthma    • DDD (degenerative disc disease), lumbar    • GERD (gastroesophageal reflux disease)    • Hyperlipidemia    • Hypertension    • Scoliosis    • Sleep apnea        Surgical History:      Past Surgical History:   Procedure Laterality Date   • CARDIAC CATHETERIZATION     • EXPLORATORY LAPAROTOMY     • EYE SURGERY     • HYSTERECTOMY             Family History: family history is not on file. Otherwise pertinent FHx was reviewed and unremarkable.     Social History:  reports that she has been smoking cigarettes. She has a 15.00 pack-year smoking history. She has never used smokeless tobacco. She reports previous alcohol use.      Medications:  Prior to Admission medications    Medication Sig Start Date End Date Taking? Authorizing Provider   atorvastatin (LIPITOR) 40 MG tablet   6/2/21   Emergency, Nurse Saulo, RN   busPIRone (BUSPAR) 15 MG tablet Take 15 mg by mouth 2 (Two) Times a Day. 5/3/21   Emergency, Nurse Epic, RN   cetirizine (zyrTEC) 10 MG tablet Take 10 mg by mouth Daily.    Provider, MD Velasquez   esomeprazole (nexIUM) 40 MG capsule Take 40 mg by mouth Every Morning Before Breakfast.    Provider, MD Velasquez   hydroCHLOROthiazide (HYDRODIURIL) 12.5 MG tablet  6/2/21   Emergency, Nurse Saulo RN   lisinopril (PRINIVIL,ZESTRIL) 40 MG tablet  6/2/21   Emergency, Nurse Saulo RN   methocarbamol (ROBAXIN) 500 MG tablet Take 1 tablet by mouth 4 (Four) Times a Day. 6/16/21   Tash Pepe APRN   methylPREDNISolone (MEDROL) 4 MG dose pack Take as directed on package instructions. 6/16/21   Tash Pepe APRN       Allergies:    Allergies   Allergen Reactions   • Iodine Anaphylaxis   • Bactrim [Sulfamethoxazole-Trimethoprim] Unknown - Low Severity   • Beeswax Swelling   • Sulfa Antibiotics Swelling       Objective   Objective     Vital Signs  Temp:  [97 °F (36.1 °C)-98.2 °F (36.8 °C)] 97 °F (36.1 °C)  Heart Rate:  [] 81  Resp:  [20] 20  BP: (136-190)/() 136/76  SpO2:  [95 %-100 %] 95 %  on   ;   Device (Oxygen Therapy): room air  Body mass index is 34.11 kg/m².    Physical Exam  Vitals and nursing note reviewed.   Constitutional:       General: She is not in acute distress.     Appearance: Normal appearance.   HENT:      Head: Normocephalic and atraumatic.      Right Ear: External ear normal.      Left Ear: External ear normal.      Nose: Nose normal.      Mouth/Throat:      Mouth: Mucous membranes are dry.      Pharynx: Oropharynx is clear.   Eyes:      General: No scleral icterus.        Right eye: No discharge.         Left eye: No discharge.      Extraocular Movements: Extraocular movements intact.      Conjunctiva/sclera: Conjunctivae normal.      Pupils: Pupils are equal, round, and reactive to light.   Cardiovascular:      Rate and Rhythm: Normal rate  and regular rhythm.      Pulses: Normal pulses.      Heart sounds: Normal heart sounds.   Pulmonary:      Effort: Pulmonary effort is normal.      Breath sounds: Normal breath sounds.   Abdominal:      General: Bowel sounds are normal.      Palpations: Abdomen is soft.   Musculoskeletal:         General: Normal range of motion.      Cervical back: Normal range of motion and neck supple.      Right lower leg: No edema.      Left lower leg: No edema.   Skin:     General: Skin is warm and dry.      Capillary Refill: Capillary refill takes less than 2 seconds.   Neurological:      General: No focal deficit present.      Mental Status: She is alert and oriented to person, place, and time.   Psychiatric:         Mood and Affect: Mood normal.         Behavior: Behavior normal.         Thought Content: Thought content normal.         Judgment: Judgment normal.           Results Review:  I have personally reviewed most recent cardiac tracings, lab results and radiology images and interpretations and agree with findings.    Results from last 7 days   Lab Units 02/15/22  1153   WBC 10*3/mm3 11.30*   HEMOGLOBIN g/dL 13.1   HEMATOCRIT % 37.9   PLATELETS 10*3/mm3 350   INR  0.97     Results from last 7 days   Lab Units 02/15/22  1153   SODIUM mmol/L 135*   POTASSIUM mmol/L 4.2   CHLORIDE mmol/L 97*   CO2 mmol/L 26.0   BUN mg/dL 8   CREATININE mg/dL 0.68   GLUCOSE mg/dL 114*   CALCIUM mg/dL 9.4   ALT (SGPT) U/L 37*   AST (SGOT) U/L 22   TROPONIN T ng/mL <0.010     Estimated Creatinine Clearance: 99.2 mL/min (by C-G formula based on SCr of 0.68 mg/dL).  Brief Urine Lab Results     None          Microbiology Results (last 10 days)     ** No results found for the last 240 hours. **          ECG/EMG Results (most recent)     Procedure Component Value Units Date/Time    ECG 12 Lead [[id number]] Collected: 02/15/22 1017     Updated: 02/15/22 1019     QT Interval 392 ms     Narrative:      HEART RATE= 87  bpm  RR Interval= 692  ms  NH  Interval= 135  ms  P Horizontal Axis= 6  deg  P Front Axis= 62  deg  QRSD Interval= 96  ms  QT Interval= 392  ms  QRS Axis= 32  deg  T Wave Axis= 46  deg  - OTHERWISE NORMAL ECG -  Sinus rhythm  Low voltage, extremity leads  Electronically Signed By:   Date and Time of Study: 2022-02-15 10:17:56          Results for orders placed during the hospital encounter of 02/15/22    Duplex venous upper extremity left    Interpretation Summary  · Normal right upper extremity venous duplex scan.          XR Chest 1 View    Result Date: 2/15/2022  No acute cardiopulmonary abnormality.  Electronically Signed By-Rufino Jay MD On:2/15/2022 12:04 PM This report was finalized on [id number] by  Rufino Jay MD.        Estimated Creatinine Clearance: 99.2 mL/min (by C-G formula based on SCr of 0.68 mg/dL).    Assessment/Plan   Assessment/Plan       Active Hospital Problems    Diagnosis  POA   • Chest pain [R07.9]  Yes   • Anxiety [F41.9]  Yes      Resolved Hospital Problems   No resolved problems to display.     Chest pain equivalent: serial troponin  -patient will need stress myoview after COVID isolation period      COVID 19 infection: droplet isolation     HLD, chornic: continue lipitor    Anxiety, chronic: continue Buspar; continue Protonix    GERD, chronic: continue Nexium with formulary substitution    Hypertension: continue lisnopril; hold hydroclorthiazide      VTE Prophylaxis -   Mechanical Order History:     None      Pharmalogical Order History:      Ordered     Dose Route Frequency Stop    02/15/22 1648  enoxaparin (LOVENOX) syringe 40 mg         40 mg SC Every 24 Hours --                CODE STATUS:    Code Status and Medical Interventions:   Ordered at: 02/15/22 1648     Code Status (Patient has no pulse and is not breathing):    CPR (Attempt to Resuscitate)     Medical Interventions (Patient has pulse or is breathing):    Full Support       This patient has been examined wearing personal protective equipment.      I discussed the patient's findings and my recommendations with patient and nursing staff.      Signature:Electronically signed by TANESHA Mayer, 02/15/22, 10:52 PM EST.

## 2022-02-15 NOTE — ED PROVIDER NOTES
Subjective   Chief complaint: Patient is a pleasant 62-year-old female.  She resents today with left arm tightness.  She states she noticed it about 7 PM last night.  Is been waxing and waning.  She was concerned about her heart and came in here.  She has increased anxiety.  She recently had COVID few weeks ago.  Her  just was diagnosed yesterday after a lengthy stay in the hospital with Covid.  Her grandson just got diagnosed with Covid and she is very scared for him.  She is very tearful.  She takes BuSpar for anxiety and feels like she needs help with her anxiety.  She is not suicidal.  She does want help.  However she also states that she has been having intermittent chest discomfort.  She thinks it is her anxiety.  However the arm tightness she has never had before with anxiety so she presented here concerned for her heart.  The chest pain is a tightness midsternal nonradiating except now for the left arm tightness.    Context: As above    Duration: Since 7 PM last night    Timing: Sudden onset and waxes and wanes    Severity: Pain is not severe    Associated Symptoms: Negative except as noted above.  Appropriate PPE was used.        PCP:  LMP: Hysterectomy          Review of Systems   Constitutional: Negative for fever.   HENT: Negative.    Respiratory: Positive for chest tightness.    Cardiovascular: Positive for chest pain.   Gastrointestinal: Negative for abdominal pain.   Neurological: Negative for weakness and numbness.   Psychiatric/Behavioral: Negative for self-injury and suicidal ideas. The patient is nervous/anxious.    All other systems reviewed and are negative.      Past Medical History:   Diagnosis Date   • Asthma    • DDD (degenerative disc disease), lumbar    • GERD (gastroesophageal reflux disease)    • Hyperlipidemia    • Hypertension    • Scoliosis    • Sleep apnea        Allergies   Allergen Reactions   • Iodine Anaphylaxis   • Bactrim [Sulfamethoxazole-Trimethoprim] Unknown - Low  Severity   • Beeswax Swelling   • Sulfa Antibiotics Swelling       Past Surgical History:   Procedure Laterality Date   • CARDIAC CATHETERIZATION     • EXPLORATORY LAPAROTOMY     • EYE SURGERY     • HYSTERECTOMY         No family history on file.    Social History     Socioeconomic History   • Marital status:    Tobacco Use   • Smoking status: Current Every Day Smoker     Packs/day: 0.50     Years: 30.00     Pack years: 15.00     Types: Cigarettes   • Smokeless tobacco: Never Used   Vaping Use   • Vaping Use: Never used   Substance and Sexual Activity   • Alcohol use: Not Currently     Comment: occasional   • Sexual activity: Never           Objective   Physical Exam  Vitals and nursing note reviewed.   Constitutional:       Appearance: Normal appearance.   HENT:      Head: Normocephalic and atraumatic.   Eyes:      Extraocular Movements: Extraocular movements intact.      Pupils: Pupils are equal, round, and reactive to light.   Cardiovascular:      Rate and Rhythm: Normal rate and regular rhythm.      Pulses: Normal pulses.      Heart sounds: Normal heart sounds.   Pulmonary:      Effort: Pulmonary effort is normal.      Breath sounds: Normal breath sounds.   Abdominal:      Tenderness: There is no abdominal tenderness.   Musculoskeletal:         General: Normal range of motion.      Cervical back: Neck supple.      Comments: Left upper extremity is neurovascular intact distally.  There is no swelling.  There is no diminished pulse.  Pulse is normal.   Skin:     General: Skin is warm and dry.      Capillary Refill: Capillary refill takes less than 2 seconds.   Neurological:      General: No focal deficit present.      Mental Status: She is alert and oriented to person, place, and time.   Psychiatric:         Mood and Affect: Mood normal.         Behavior: Behavior normal.         Thought Content: Thought content normal.         Judgment: Judgment normal.         Procedures           ED Course      EKG sinus  rhythm low voltage rate of 87           Results for orders placed or performed during the hospital encounter of 02/15/22   Comprehensive Metabolic Panel    Specimen: Blood   Result Value Ref Range    Glucose 114 (H) 65 - 99 mg/dL    BUN 8 8 - 23 mg/dL    Creatinine 0.68 0.57 - 1.00 mg/dL    Sodium 135 (L) 136 - 145 mmol/L    Potassium 4.2 3.5 - 5.2 mmol/L    Chloride 97 (L) 98 - 107 mmol/L    CO2 26.0 22.0 - 29.0 mmol/L    Calcium 9.4 8.6 - 10.5 mg/dL    Total Protein 6.9 6.0 - 8.5 g/dL    Albumin 4.40 3.50 - 5.20 g/dL    ALT (SGPT) 37 (H) 1 - 33 U/L    AST (SGOT) 22 1 - 32 U/L    Alkaline Phosphatase 75 39 - 117 U/L    Total Bilirubin 0.5 0.0 - 1.2 mg/dL    eGFR Non African Amer 88 >60 mL/min/1.73    Globulin 2.5 gm/dL    A/G Ratio 1.8 g/dL    BUN/Creatinine Ratio 11.8 7.0 - 25.0    Anion Gap 12.0 5.0 - 15.0 mmol/L   Protime-INR    Specimen: Blood   Result Value Ref Range    Protime 10.8 9.6 - 11.7 Seconds    INR 0.97 0.93 - 1.10   aPTT    Specimen: Blood   Result Value Ref Range    PTT 24.7 24.0 - 31.0 seconds   Troponin    Specimen: Blood   Result Value Ref Range    Troponin T <0.010 0.000 - 0.030 ng/mL   CBC Auto Differential    Specimen: Blood   Result Value Ref Range    WBC 11.30 (H) 3.40 - 10.80 10*3/mm3    RBC 4.30 3.77 - 5.28 10*6/mm3    Hemoglobin 13.1 12.0 - 15.9 g/dL    Hematocrit 37.9 34.0 - 46.6 %    MCV 88.1 79.0 - 97.0 fL    MCH 30.4 26.6 - 33.0 pg    MCHC 34.5 31.5 - 35.7 g/dL    RDW 13.1 12.3 - 15.4 %    RDW-SD 40.7 37.0 - 54.0 fl    MPV 7.9 6.0 - 12.0 fL    Platelets 350 140 - 450 10*3/mm3    Neutrophil % 73.5 42.7 - 76.0 %    Lymphocyte % 18.5 (L) 19.6 - 45.3 %    Monocyte % 5.9 5.0 - 12.0 %    Eosinophil % 0.8 0.3 - 6.2 %    Basophil % 1.3 0.0 - 1.5 %    Neutrophils, Absolute 8.30 (H) 1.70 - 7.00 10*3/mm3    Lymphocytes, Absolute 2.10 0.70 - 3.10 10*3/mm3    Monocytes, Absolute 0.70 0.10 - 0.90 10*3/mm3    Eosinophils, Absolute 0.10 0.00 - 0.40 10*3/mm3    Basophils, Absolute 0.10 0.00 - 0.20  10*3/mm3    nRBC 0.0 0.0 - 0.2 /100 WBC   ECG 12 Lead   Result Value Ref Range    QT Interval 392 ms   Duplex venous upper extremity left   Result Value Ref Range    Target HR (85%) 136 bpm    Max. Pred. HR (100%) 160 bpm    Right Internal Jugular Augment Y     Right Internal Jugular Competent Y     Right Internal Jugular Compress C     Right Internal Jugular Phasic Y     Right Internal Jugular Spont Y     Left Internal Jugular Augment Y     Left Internal Jugular Competent Y     Left Internal Jugular Compress C     Left Internal Jugular Phasic Y     Left Internal Jugular Spont Y     Right Subclavian Augment Y     Right Subclavian Competent Y     Right Subclavian Compress C     Right Subclavian Phasic Y     Right Subclavian Spont Y     Left Subclavian Augment Y     Left Subclavian Competent Y     Left Subclavian Compress C     Left Subclavian Phasic Y     Left Subclavian Spont Y     Left Axillary Augment Y     Left Axillary Competent Y     Left Axillary Compress C     Left Axillary Phasic Y     Left Axillary Spont Y     Left Brachial Compress C     Left Radial Compress C     Left Ulnar Compress C     Left Basilic Upper Compress C     Left Basilic Forearm Compress C     Left Cephalic Upper Compress C     Left Cephalic Forearm Compress C        XR Chest 1 View    Result Date: 2/15/2022  No acute cardiopulmonary abnormality.  Electronically Signed By-Rufino Jay MD On:2/15/2022 12:04 PM This report was finalized on [id number] by  Rufino Jay MD.                                     MDM  Number of Diagnoses or Management Options  Anxiety  Chest pain, unspecified type  Diagnosis management comments: Patient does exhibit recurrent episodes of chest pain to the last night.  It is a tightness.  It was nonradiating at first.  She has noticed left arm tightness.  No active chest pain currently however she does have persisting left arm tightness.  She does have multiple risk factors for coronary disease.  She has  hypertension, hyperlipidemia and she is 62 years old with family history.  Heart score 3.  Discussed the risks and benefits of staying.  She has had no recent stress test.  She has no suicidal ideation.  She does have increased anxiety and stressors in her life that could also exacerbate underlying heart disease.  Will place in the ED observation unit overnight for cardiac rule out.  Patient verbalized understanding is okay with this.       Amount and/or Complexity of Data Reviewed  Clinical lab tests: reviewed  Tests in the radiology section of CPT®: reviewed  Tests in the medicine section of CPT®: reviewed  Discussion of test results with the performing providers: yes  Discuss the patient with other providers: yes  Independent visualization of images, tracings, or specimens: yes    Risk of Complications, Morbidity, and/or Mortality  Presenting problems: high  Management options: high    Patient Progress  Patient progress: stable      Final diagnoses:   None   Chest pain  Anxiety    ED Disposition  ED Disposition     None          No follow-up provider specified.       Medication List      No changes were made to your prescriptions during this visit.          Osmani Leonard,   02/15/22 1341       Osmani Leonard,   02/15/22 1430

## 2022-02-15 NOTE — NURSING NOTE
LUCIANO CLINTON D/T PT'S DAUGHTER HERE VISITING HER MOM IN HER COVID ROOM; PT HAD TOLD THIS NURSE THAT SHE WOULD NEED A RIDE HOME D/T HER DAUGHTER WOULD NOT COME NEAR HER WITH THE + COVID

## 2022-02-16 ENCOUNTER — READMISSION MANAGEMENT (OUTPATIENT)
Dept: CALL CENTER | Facility: HOSPITAL | Age: 62
End: 2022-02-16

## 2022-02-16 VITALS
DIASTOLIC BLOOD PRESSURE: 78 MMHG | BODY MASS INDEX: 31.59 KG/M2 | SYSTOLIC BLOOD PRESSURE: 137 MMHG | HEIGHT: 65 IN | WEIGHT: 189.6 LBS | OXYGEN SATURATION: 97 % | RESPIRATION RATE: 18 BRPM | TEMPERATURE: 97.7 F | HEART RATE: 85 BPM

## 2022-02-16 LAB
ANION GAP SERPL CALCULATED.3IONS-SCNC: 9 MMOL/L (ref 5–15)
BASOPHILS # BLD AUTO: 0 10*3/MM3 (ref 0–0.2)
BASOPHILS NFR BLD AUTO: 0.2 % (ref 0–1.5)
BUN SERPL-MCNC: 13 MG/DL (ref 8–23)
BUN/CREAT SERPL: 14.1 (ref 7–25)
CALCIUM SPEC-SCNC: 9.6 MG/DL (ref 8.6–10.5)
CHLORIDE SERPL-SCNC: 100 MMOL/L (ref 98–107)
CO2 SERPL-SCNC: 28 MMOL/L (ref 22–29)
CREAT SERPL-MCNC: 0.92 MG/DL (ref 0.57–1)
DEPRECATED RDW RBC AUTO: 40.7 FL (ref 37–54)
EOSINOPHIL # BLD AUTO: 0.2 10*3/MM3 (ref 0–0.4)
EOSINOPHIL NFR BLD AUTO: 2.1 % (ref 0.3–6.2)
ERYTHROCYTE [DISTWIDTH] IN BLOOD BY AUTOMATED COUNT: 13.2 % (ref 12.3–15.4)
GFR SERPL CREATININE-BSD FRML MDRD: 62 ML/MIN/1.73
GLUCOSE SERPL-MCNC: 152 MG/DL (ref 65–99)
HCT VFR BLD AUTO: 36.9 % (ref 34–46.6)
HGB BLD-MCNC: 12.7 G/DL (ref 12–15.9)
LYMPHOCYTES # BLD AUTO: 1.7 10*3/MM3 (ref 0.7–3.1)
LYMPHOCYTES NFR BLD AUTO: 21.1 % (ref 19.6–45.3)
MCH RBC QN AUTO: 30.6 PG (ref 26.6–33)
MCHC RBC AUTO-ENTMCNC: 34.6 G/DL (ref 31.5–35.7)
MCV RBC AUTO: 88.4 FL (ref 79–97)
MONOCYTES # BLD AUTO: 0.7 10*3/MM3 (ref 0.1–0.9)
MONOCYTES NFR BLD AUTO: 8.2 % (ref 5–12)
NEUTROPHILS NFR BLD AUTO: 5.7 10*3/MM3 (ref 1.7–7)
NEUTROPHILS NFR BLD AUTO: 68.4 % (ref 42.7–76)
NRBC BLD AUTO-RTO: 0 /100 WBC (ref 0–0.2)
PLATELET # BLD AUTO: 326 10*3/MM3 (ref 140–450)
PMV BLD AUTO: 8.3 FL (ref 6–12)
POTASSIUM SERPL-SCNC: 4.6 MMOL/L (ref 3.5–5.2)
PROCALCITONIN SERPL-MCNC: <0.02 NG/ML (ref 0–0.25)
QT INTERVAL: 392 MS
RBC # BLD AUTO: 4.17 10*6/MM3 (ref 3.77–5.28)
SODIUM SERPL-SCNC: 137 MMOL/L (ref 136–145)
WBC NRBC COR # BLD: 8.3 10*3/MM3 (ref 3.4–10.8)

## 2022-02-16 PROCEDURE — 84145 PROCALCITONIN (PCT): CPT | Performed by: PHYSICIAN ASSISTANT

## 2022-02-16 PROCEDURE — 85025 COMPLETE CBC W/AUTO DIFF WBC: CPT | Performed by: PHYSICIAN ASSISTANT

## 2022-02-16 PROCEDURE — 80048 BASIC METABOLIC PNL TOTAL CA: CPT | Performed by: PHYSICIAN ASSISTANT

## 2022-02-16 PROCEDURE — G0378 HOSPITAL OBSERVATION PER HR: HCPCS

## 2022-02-16 RX ORDER — LORAZEPAM 0.5 MG/1
0.5 TABLET ORAL ONCE
Status: COMPLETED | OUTPATIENT
Start: 2022-02-16 | End: 2022-02-16

## 2022-02-16 RX ORDER — LORAZEPAM 0.5 MG/1
0.5 TABLET ORAL EVERY 8 HOURS PRN
Qty: 6 TABLET | Refills: 0 | Status: SHIPPED | OUTPATIENT
Start: 2022-02-16 | End: 2022-02-18

## 2022-02-16 RX ADMIN — CETIRIZINE HYDROCHLORIDE 10 MG: 10 TABLET, FILM COATED ORAL at 08:06

## 2022-02-16 RX ADMIN — BUSPIRONE HYDROCHLORIDE 15 MG: 15 TABLET ORAL at 08:06

## 2022-02-16 RX ADMIN — LISINOPRIL 40 MG: 20 TABLET ORAL at 08:04

## 2022-02-16 RX ADMIN — PANTOPRAZOLE SODIUM 40 MG: 40 TABLET, DELAYED RELEASE ORAL at 08:06

## 2022-02-16 RX ADMIN — SODIUM CHLORIDE, PRESERVATIVE FREE 10 ML: 5 INJECTION INTRAVENOUS at 08:06

## 2022-02-16 RX ADMIN — LORAZEPAM 0.5 MG: 0.5 TABLET ORAL at 11:18

## 2022-02-16 NOTE — PLAN OF CARE
Problem: Adult Inpatient Plan of Care  Goal: Plan of Care Review  Outcome: Ongoing, Progressing  Flowsheets (Taken 2/16/2022 1257)  Progress: improving  Plan of Care Reviewed With: patient  Outcome Summary: Pt admitted for chest pain and elevated troponin. Currently receiving heparin drip. Denies CP at this time. Heart cath this afternoon with Dr Bland. Up ad sunshine. NO SOB. Safety maintained, will continue to monitor.  Goal: Patient-Specific Goal (Individualized)  Outcome: Ongoing, Progressing  Goal: Absence of Hospital-Acquired Illness or Injury  Outcome: Ongoing, Progressing  Intervention: Identify and Manage Fall Risk  Recent Flowsheet Documentation  Taken 2/16/2022 1119 by Trixie Diaz RN  Safety Promotion/Fall Prevention:   safety round/check completed   clutter free environment maintained  Taken 2/16/2022 1032 by Trixie Diaz RN  Safety Promotion/Fall Prevention: safety round/check completed  Taken 2/16/2022 0902 by Trixie Diaz RN  Safety Promotion/Fall Prevention: safety round/check completed  Taken 2/16/2022 0806 by Trixie Diaz RN  Safety Promotion/Fall Prevention: safety round/check completed  Taken 2/16/2022 0715 by Trixie Diaz RN  Safety Promotion/Fall Prevention: safety round/check completed  Intervention: Prevent Skin Injury  Recent Flowsheet Documentation  Taken 2/16/2022 1119 by Trixie Diaz RN  Body Position: dangle, side of bed  Taken 2/16/2022 1032 by Trixie Diaz RN  Body Position:   position changed independently   dangle, side of bed  Taken 2/16/2022 0902 by Trixie Diaz RN  Body Position: dangle, side of bed  Taken 2/16/2022 0806 by Trixie Diaz RN  Body Position:   position changed independently   sitting up in bed  Taken 2/16/2022 0715 by Trixie Diaz RN  Body Position: position changed independently  Goal: Optimal Comfort and Wellbeing  Outcome: Ongoing, Progressing  Intervention: Provide Person-Centered Care  Recent  Flowsheet Documentation  Taken 2/16/2022 1119 by Trixie Diaz RN  Trust Relationship/Rapport: choices provided  Taken 2/16/2022 1032 by Trixie Diaz RN  Trust Relationship/Rapport: care explained  Taken 2/16/2022 0902 by Trixie Diaz RN  Trust Relationship/Rapport: care explained  Taken 2/16/2022 0806 by Trixie Diaz RN  Trust Relationship/Rapport: care explained  Taken 2/16/2022 0715 by Trixie Diaz RN  Trust Relationship/Rapport: care explained  Goal: Readiness for Transition of Care  Outcome: Ongoing, Progressing   Goal Outcome Evaluation:  Plan of Care Reviewed With: patient        Progress: improving  Outcome Summary: Pt admitted for chest pain and elevated troponin. Currently receiving heparin drip. Denies CP at this time. Heart cath this afternoon with Dr Bland. Up ad sunshine. NO SOB. Safety maintained, will continue to monitor.

## 2022-02-16 NOTE — PLAN OF CARE
Problem: Adult Inpatient Plan of Care  Goal: Plan of Care Review  2/16/2022 1358 by Trixie Diaz RN  Outcome: Ongoing, Progressing  Flowsheets (Taken 2/16/2022 1358)  Progress: improving  Plan of Care Reviewed With: patient  Outcome Summary: Denies CP. Complains of anxiety gave ativan. Safety maintain, ctm.  2/16/2022 1257 by Trixie Diaz RN  Outcome: Ongoing, Progressing  Flowsheets (Taken 2/16/2022 1257)  Progress: improving  Plan of Care Reviewed With: patient  Outcome Summary: Pt admitted for chest pain and elevated troponin. Currently receiving heparin drip. Denies CP at this time. Heart cath this afternoon with Dr Bland. Up ad sunshine. NO SOB. Safety maintained, will continue to monitor.  Goal: Patient-Specific Goal (Individualized)  2/16/2022 1358 by Trixie Diaz RN  Outcome: Ongoing, Progressing  2/16/2022 1257 by Trixie Diaz RN  Outcome: Ongoing, Progressing  Goal: Absence of Hospital-Acquired Illness or Injury  2/16/2022 1358 by Trixie Diaz RN  Outcome: Ongoing, Progressing  2/16/2022 1257 by Trixie Diaz RN  Outcome: Ongoing, Progressing  Intervention: Identify and Manage Fall Risk  Recent Flowsheet Documentation  Taken 2/16/2022 1358 by Trixie Diaz RN  Safety Promotion/Fall Prevention: safety round/check completed  Taken 2/16/2022 1300 by Trixie Diaz RN  Safety Promotion/Fall Prevention: safety round/check completed  Taken 2/16/2022 1119 by Trixie Diaz RN  Safety Promotion/Fall Prevention:   safety round/check completed   clutter free environment maintained  Taken 2/16/2022 1032 by Trixie Diaz RN  Safety Promotion/Fall Prevention: safety round/check completed  Taken 2/16/2022 0902 by Trixie Diaz RN  Safety Promotion/Fall Prevention: safety round/check completed  Taken 2/16/2022 0806 by Trixie Diaz RN  Safety Promotion/Fall Prevention: safety round/check completed  Taken 2/16/2022 0715 by Trixie Diaz  RN  Safety Promotion/Fall Prevention: safety round/check completed  Intervention: Prevent Skin Injury  Recent Flowsheet Documentation  Taken 2/16/2022 1119 by Trixie Diaz RN  Body Position: dangle, side of bed  Taken 2/16/2022 1032 by Trixie Diaz RN  Body Position:   position changed independently   dangle, side of bed  Taken 2/16/2022 0902 by Trixie Diaz RN  Body Position: dangle, side of bed  Taken 2/16/2022 0806 by Trixie Diaz RN  Body Position:   position changed independently   sitting up in bed  Taken 2/16/2022 0715 by Trixie Diaz RN  Body Position: position changed independently  Goal: Optimal Comfort and Wellbeing  2/16/2022 1358 by Trixie Diaz RN  Outcome: Ongoing, Progressing  2/16/2022 1257 by Trixie Diaz RN  Outcome: Ongoing, Progressing  Intervention: Provide Person-Centered Care  Recent Flowsheet Documentation  Taken 2/16/2022 1119 by Trixie Diaz RN  Trust Relationship/Rapport: choices provided  Taken 2/16/2022 1032 by Trixie Diaz RN  Trust Relationship/Rapport: care explained  Taken 2/16/2022 0902 by Trixie Diaz RN  Trust Relationship/Rapport: care explained  Taken 2/16/2022 0806 by Trixie Diaz RN  Trust Relationship/Rapport: care explained  Taken 2/16/2022 0715 by Trixie Diaz RN  Trust Relationship/Rapport: care explained  Goal: Readiness for Transition of Care  2/16/2022 1358 by Trixie Diaz RN  Outcome: Ongoing, Progressing  2/16/2022 1257 by Trixie Diaz RN  Outcome: Ongoing, Progressing   Goal Outcome Evaluation:  Plan of Care Reviewed With: patient        Progress: improving  Outcome Summary: Denies CP. Complains of anxiety gave ativan. Safety maintain, ctm.

## 2022-02-16 NOTE — NURSING NOTE
Pt offered benadryl for c/o of sinus drainage however refused. Pt remembered that she has been started on Prozac and takes at night; will call for ordered and added to her med list

## 2022-02-16 NOTE — PLAN OF CARE
Problem: Adult Inpatient Plan of Care  Goal: Plan of Care Review  Outcome: Ongoing, Progressing  Flowsheets (Taken 2/16/2022 0153)  Progress: improving   Goal Outcome Evaluation:           Progress: improving     Pt resting at this time. No c/o voiced at this time. No s/s of anxiety. Will cont to monitor.

## 2022-02-16 NOTE — DISCHARGE SUMMARY
[facility]    PrafulRadha APRN    CHIEF COMPLAINT:     Left arm pain    HISTORY OF PRESENT ILLNESS:    Obtained from H&P on 2/15/2022:  Ms. Borjas is a 62 y.o.  presents to [facility] complaining of left arm pain.         62 year old female presents to ER with a chief complaint of left arm pain, chest pain equivalent, which began as she was at rest yesterday p.m.  She denies shortness of breath with exertion.  She denies nausea or diaphoresis. The patient and her  have had recent upper respiratory illness and her  was positive for Covid 19.      2/16/2022: Patient confirms the HPI noted above including recent left arm pain which has since resolved as well as exposure to Covid19 from her .  She denies any recent falls or trauma and states that since her admission her pain has resolved.  Patient does note however that she has significant problem with generalized anxiety disorder which is poorly controlled and that she is currently taking multiple scheduled medications however given her  and son's recent Covid infections and her  subsequent hospitalization she feels her stress and anxiety have been significantly higher and has also noted increase in blood pressure during this time which has also made her anxious.  The time of my exam patient is anxious and somewhat tearful regarding these recent events.  Some recent fatigue as well as a productive cough is also noted however she denies any specific dyspnea, chest pain, nausea or vomiting.  Patient also reports that she has had cardiac work-ups in the past including stress test which have been negative        Past Medical History:   Diagnosis Date   • Asthma    • DDD (degenerative disc disease), lumbar    • GERD (gastroesophageal reflux disease)    • Hyperlipidemia    • Hypertension    • Scoliosis    • Sleep apnea      Past Surgical History:   Procedure Laterality Date   • CARDIAC  CATHETERIZATION     • EXPLORATORY LAPAROTOMY     • EYE SURGERY     • HYSTERECTOMY       History reviewed. No pertinent family history.  Social History     Tobacco Use   • Smoking status: Current Every Day Smoker     Packs/day: 0.50     Years: 30.00     Pack years: 15.00     Types: Cigarettes   • Smokeless tobacco: Never Used   Vaping Use   • Vaping Use: Never used   Substance Use Topics   • Alcohol use: Not Currently     Comment: occasional   • Drug use: Not on file     Medications Prior to Admission   Medication Sig Dispense Refill Last Dose   • atorvastatin (LIPITOR) 40 MG tablet    2/14/2022 at 2100   • busPIRone (BUSPAR) 15 MG tablet Take 15 mg by mouth 2 (Two) Times a Day.   2/15/2022 at 0900   • cetirizine (zyrTEC) 10 MG tablet Take 10 mg by mouth Daily.   2/15/2022 at 0900   • esomeprazole (nexIUM) 40 MG capsule Take 40 mg by mouth Every Morning Before Breakfast.   2/15/2022 at Unknown time   • FLUoxetine (PROzac) 20 MG capsule Take 20 mg by mouth every night at bedtime.   2/14/2022 at 2100   • hydroCHLOROthiazide (HYDRODIURIL) 12.5 MG tablet    2/15/2022 at 0900   • lisinopril (PRINIVIL,ZESTRIL) 40 MG tablet    2/15/2022 at 0900     Allergies:  Iodine, Bactrim [sulfamethoxazole-trimethoprim], Beeswax, and Sulfa antibiotics      There is no immunization history on file for this patient.        REVIEW OF SYSTEMS:    Review of Systems   Constitutional: Negative.   HENT: Negative.    Eyes: Negative.    Cardiovascular: Negative.         Left arm pain   Respiratory: Negative.    Endocrine: Negative.    Skin: Negative.    Musculoskeletal: Negative.    Gastrointestinal: Negative.    Genitourinary: Negative.    Neurological: Negative.    Psychiatric/Behavioral: Negative.        Vital Signs  Temp:  [97.7 °F (36.5 °C)-98.3 °F (36.8 °C)] 97.7 °F (36.5 °C)  Heart Rate:  [73-85] 85  Resp:  [18-20] 18  BP: (133-140)/(76-87) 137/78          Physical Exam:  Physical Exam  Vitals reviewed.   Constitutional:       General: She  is not in acute distress.     Appearance: Normal appearance. She is obese. She is not ill-appearing, toxic-appearing or diaphoretic.   HENT:      Head: Normocephalic and atraumatic.      Right Ear: External ear normal.      Left Ear: External ear normal.      Nose: Nose normal.      Mouth/Throat:      Mouth: Mucous membranes are moist.   Eyes:      Extraocular Movements: Extraocular movements intact.   Cardiovascular:      Rate and Rhythm: Normal rate and regular rhythm.      Pulses: Normal pulses.      Heart sounds: Normal heart sounds.   Pulmonary:      Effort: Pulmonary effort is normal.      Breath sounds: Normal breath sounds.   Abdominal:      General: Bowel sounds are normal. There is no distension.      Palpations: Abdomen is soft.      Tenderness: There is no abdominal tenderness.   Musculoskeletal:         General: Normal range of motion.      Cervical back: Normal range of motion.   Skin:     General: Skin is warm and dry.      Capillary Refill: Capillary refill takes less than 2 seconds.   Neurological:      Mental Status: She is alert and oriented to person, place, and time.   Psychiatric:         Behavior: Behavior normal.         Thought Content: Thought content normal.         Judgment: Judgment normal.      Comments: Anxious         Emotional Behavior:   Anxious   Debilities:  None  Results Review:    I reviewed the patient's new clinical results.  Lab Results (most recent)     Procedure Component Value Units Date/Time    Troponin [[id number]]  (Normal) Collected: 02/15/22 1808    Specimen: Blood Updated: 02/15/22 1932     Troponin T <0.010 ng/mL     Narrative:      Troponin T Reference Range:  <= 0.03 ng/mL-   Negative for AMI  >0.03 ng/mL-     Abnormal for myocardial necrosis.  Clinicians would have to utilize clinical acumen, EKG, Troponin and serial changes to determine if it is an Acute Myocardial Infarction or myocardial injury due to an underlying chronic condition.       Results may be  falsely decreased if patient taking Biotin.      COVID PRE-OP / PRE-PROCEDURE SCREENING ORDER (NO ISOLATION) - Swab, Nasopharynx [[id number]]  (Abnormal) Collected: 02/15/22 1614    Specimen: Swab from Nasopharynx Updated: 02/15/22 1708    Narrative:      The following orders were created for panel order COVID PRE-OP / PRE-PROCEDURE SCREENING ORDER (NO ISOLATION) - Swab, Nasopharynx.  Procedure                               Abnormality         Status                     ---------                               -----------         ------                     COVID-19,CEPHEID/MARIETTA,CO...[[id number]]  Abnormal            Final result                 Please view results for these tests on the individual orders.    COVID-19,CEPHEID/MARIETTA,COR/AYANNA/PAD/JIHAN IN-HOUSE(OR EMERGENT/ADD-ON),NP SWAB IN TRANSPORT MEDIA 3-4 HR TAT, RT-PCR - Swab, Nasopharynx [[id number]]  (Abnormal) Collected: 02/15/22 1614    Specimen: Swab from Nasopharynx Updated: 02/15/22 1708     COVID19 Detected    Narrative:      Fact sheet for providers: https://www.fda.gov/media/837591/download     Fact sheet for patients: https://www.fda.gov/media/939221/download  Fact sheet for providers: https://www.fda.gov/media/631041/download    Fact sheet for patients: https://www.fda.gov/media/529041/download    Test performed by PCR.    Comprehensive Metabolic Panel [[id number]]  (Abnormal) Collected: 02/15/22 1153    Specimen: Blood Updated: 02/15/22 1237     Glucose 114 mg/dL      BUN 8 mg/dL      Creatinine 0.68 mg/dL      Sodium 135 mmol/L      Potassium 4.2 mmol/L      Chloride 97 mmol/L      CO2 26.0 mmol/L      Calcium 9.4 mg/dL      Total Protein 6.9 g/dL      Albumin 4.40 g/dL      ALT (SGPT) 37 U/L      AST (SGOT) 22 U/L      Alkaline Phosphatase 75 U/L      Total Bilirubin 0.5 mg/dL      eGFR Non African Amer 88 mL/min/1.73      Globulin 2.5 gm/dL      A/G Ratio 1.8 g/dL      BUN/Creatinine Ratio 11.8     Anion Gap 12.0 mmol/L     Narrative:      GFR Normal  >60  Chronic Kidney Disease <60  Kidney Failure <15      Troponin [[id number]]  (Normal) Collected: 02/15/22 1153    Specimen: Blood Updated: 02/15/22 1237     Troponin T <0.010 ng/mL     Narrative:      Troponin T Reference Range:  <= 0.03 ng/mL-   Negative for AMI  >0.03 ng/mL-     Abnormal for myocardial necrosis.  Clinicians would have to utilize clinical acumen, EKG, Troponin and serial changes to determine if it is an Acute Myocardial Infarction or myocardial injury due to an underlying chronic condition.       Results may be falsely decreased if patient taking Biotin.      aPTT [[id number]]  (Normal) Collected: 02/15/22 1153    Specimen: Blood Updated: 02/15/22 1231     PTT 24.7 seconds     Protime-INR [[id number]]  (Normal) Collected: 02/15/22 1153    Specimen: Blood Updated: 02/15/22 1231     Protime 10.8 Seconds      INR 0.97    CBC & Differential [[id number]]  (Abnormal) Collected: 02/15/22 1153    Specimen: Blood Updated: 02/15/22 1204    Narrative:      The following orders were created for panel order CBC & Differential.  Procedure                               Abnormality         Status                     ---------                               -----------         ------                     CBC Auto Differential[[id number]]        Abnormal            Final result                 Please view results for these tests on the individual orders.    CBC Auto Differential [[id number]]  (Abnormal) Collected: 02/15/22 1153    Specimen: Blood Updated: 02/15/22 1204     WBC 11.30 10*3/mm3      RBC 4.30 10*6/mm3      Hemoglobin 13.1 g/dL      Hematocrit 37.9 %      MCV 88.1 fL      MCH 30.4 pg      MCHC 34.5 g/dL      RDW 13.1 %      RDW-SD 40.7 fl      MPV 7.9 fL      Platelets 350 10*3/mm3      Neutrophil % 73.5 %      Lymphocyte % 18.5 %      Monocyte % 5.9 %      Eosinophil % 0.8 %      Basophil % 1.3 %      Neutrophils, Absolute 8.30 10*3/mm3      Lymphocytes, Absolute 2.10 10*3/mm3      Monocytes, Absolute 0.70  10*3/mm3      Eosinophils, Absolute 0.10 10*3/mm3      Basophils, Absolute 0.10 10*3/mm3      nRBC 0.0 /100 WBC           Imaging Results (Most Recent)     Procedure Component Value Units Date/Time    XR Chest 1 View [[id number]] Collected: 02/15/22 1203     Updated: 02/15/22 1206    Narrative:      DATE OF EXAM:  2/15/2022 11:40 AM     PROCEDURE:  XR CHEST 1 VW-     INDICATIONS:  chest pain     COMPARISON:  8/24/2018     TECHNIQUE:   Single radiographic view of the chest was obtained.     FINDINGS:  Lungs are normally expanded. Heart size is normal. No pneumothorax or  pleural effusion or focal pulmonary parenchymal opacity. Pulmonary  vessels are distinct. Bones and soft tissues are normal.       Impression:      No acute cardiopulmonary abnormality.     Electronically Signed By-Rufino Jay MD On:2/15/2022 12:04 PM  This report was finalized on [id number] by  Rufino Jay MD.        reviewed    ECG/EMG Results (most recent)     Procedure Component Value Units Date/Time    ECG 12 Lead [[id number]] Collected: 02/15/22 1017     Updated: 02/16/22 0717     QT Interval 392 ms     Narrative:      HEART RATE= 87  bpm  RR Interval= 692  ms  MN Interval= 135  ms  P Horizontal Axis= 6  deg  P Front Axis= 62  deg  QRSD Interval= 96  ms  QT Interval= 392  ms  QRS Axis= 32  deg  T Wave Axis= 46  deg  - OTHERWISE NORMAL ECG -  Sinus rhythm  Low voltage, extremity leads  When compared with ECG of 07-Mar-2020 7:05:17,  Nonspecific significant change  Electronically Signed By: Osmani Leonard (AYANNA) 16-Feb-2022 07:17:23  Date and Time of Study: 2022-02-15 10:17:56    SCANNED - TELEMETRY   [[id number]] Resulted: 02/15/22     Updated: 02/16/22 1010    SCANNED - TELEMETRY   [[id number]] Resulted: 02/15/22     Updated: 02/16/22 1018        reviewed    Results for orders placed during the hospital encounter of 02/15/22    Duplex venous upper extremity left    Interpretation Summary  · Normal right upper extremity venous duplex  scan.          Microbiology Results (last 10 days)     Procedure Component Value - Date/Time    COVID PRE-OP / PRE-PROCEDURE SCREENING ORDER (NO ISOLATION) - Swab, Nasopharynx [[id number]]  (Abnormal) Collected: 02/15/22 1614    Lab Status: Final result Specimen: Swab from Nasopharynx Updated: 02/15/22 1708    Narrative:      The following orders were created for panel order COVID PRE-OP / PRE-PROCEDURE SCREENING ORDER (NO ISOLATION) - Swab, Nasopharynx.  Procedure                               Abnormality         Status                     ---------                               -----------         ------                     COVID-19,CEPHEID/MARIETTA,CO...[[id number]]  Abnormal            Final result                 Please view results for these tests on the individual orders.    COVID-19,CEPHEID/MARIETTA,COR/AYANNA/PAD/JIHAN IN-HOUSE(OR EMERGENT/ADD-ON),NP SWAB IN TRANSPORT MEDIA 3-4 HR TAT, RT-PCR - Swab, Nasopharynx [[id number]]  (Abnormal) Collected: 02/15/22 1614    Lab Status: Final result Specimen: Swab from Nasopharynx Updated: 02/15/22 1708     COVID19 Detected    Narrative:      Fact sheet for providers: https://www.fda.gov/media/402728/download     Fact sheet for patients: https://www.fda.gov/media/202874/download  Fact sheet for providers: https://www.fda.gov/media/764026/download    Fact sheet for patients: https://www.fda.gov/media/605307/download    Test performed by PCR.          Assessment/Plan     Chest pain    Anxiety       Chest pain equivalent: serial troponin  -patient will need stress myoview after COVID isolation period    -Serial troponins less than 0.010    Mild leukocytosis of 11.30 and increased absolute neutrophil count noted on differential, trended to 8.30 following admission  -     COVID 19 infection: droplet isolation      HLD, chornic: continue lipitor     Anxiety, chronic: continue Buspar; continue Protonix     GERD, chronic: continue Nexium with formulary substitution     Hypertension: continue  lisnopril; hold hydroclorthiazide    I discussed the patients findings and my recommendations with patient and nursing staff.     Discharge Diagnosis:      Chest pain    Anxiety      Hospital Course  Patient is a 62 y.o. female presented with left arm pain with an HPI noted above along with recent COVID-19 exposure.  Serial troponins remain less than 0.010 and chest x-ray showed no acute cardiopulmonary process.  A mild leukocytosis of 11.30 with an increased absolute neutrophil count was noted on differential.  Patient's COVID-19 testing was also noted is positive.  EKG was obtained which showed sinus rhythm at 87 without obvious acute ST changes or ectopy with a QTC of 471 ms.  She was given 3 and 24 mg aspirin in the ED along with 0.5 mg Ativan tablet which patient reports she tolerated well and seemed to help her symptoms.  She was maintained on telemetry throughout her admission without significant events noted.  Symptoms at this point have generally resolved though patient does remain significantly anxious.  CBC was reassessed with WBCs trending to 8.30.  At this time patient is felt to be in good condition for discharge with a short course of as needed Ativan given as well as a referral to psychiatry.  She is also instructed to monitor and log her blood pressures and heart rates to discuss with PCP and cardiology at next visit.  She will follow-up with her PCP once isolation precaution time has completed and with cardiology in 2 weeks to discuss the need for possible outpatient stress testing.  Her full testing/results and plan were discussed with patient long with concerning/alarm symptoms for which to call 911/return to the ED.  Instructions were also given not to drive while taking benzodiazepines.  All questions were answered and she verbalizes her understanding and agreement.    Past Medical History:     Past Medical History:   Diagnosis Date   • Asthma    • DDD (degenerative disc disease), lumbar    •  GERD (gastroesophageal reflux disease)    • Hyperlipidemia    • Hypertension    • Scoliosis    • Sleep apnea        Past Surgical History:     Past Surgical History:   Procedure Laterality Date   • CARDIAC CATHETERIZATION     • EXPLORATORY LAPAROTOMY     • EYE SURGERY     • HYSTERECTOMY         Social History:   Social History     Socioeconomic History   • Marital status:    Tobacco Use   • Smoking status: Current Every Day Smoker     Packs/day: 0.50     Years: 30.00     Pack years: 15.00     Types: Cigarettes   • Smokeless tobacco: Never Used   Vaping Use   • Vaping Use: Never used   Substance and Sexual Activity   • Alcohol use: Not Currently     Comment: occasional   • Sexual activity: Defer       Procedures Performed         Consults:   Consults     No orders found for last 30 day(s).          Condition on Discharge:     Stable    Discharge Disposition  Home or Self Care    Discharge Medications     Discharge Medications      New Medications      Instructions Start Date   LORazepam 0.5 MG tablet  Commonly known as: Ativan   0.5 mg, Oral, Every 8 Hours PRN         Continue These Medications      Instructions Start Date   atorvastatin 40 MG tablet  Commonly known as: LIPITOR   No dose, route, or frequency recorded.      busPIRone 15 MG tablet  Commonly known as: BUSPAR   15 mg, Oral, 2 Times Daily      cetirizine 10 MG tablet  Commonly known as: zyrTEC   10 mg, Oral, Daily      esomeprazole 40 MG capsule  Commonly known as: nexIUM   40 mg, Oral, Every Morning Before Breakfast      FLUoxetine 20 MG capsule  Commonly known as: PROzac   20 mg, Oral, Every Night at Bedtime      hydroCHLOROthiazide 12.5 MG tablet  Commonly known as: HYDRODIURIL   No dose, route, or frequency recorded.      lisinopril 40 MG tablet  Commonly known as: PRINIVIL,ZESTRIL   No dose, route, or frequency recorded.             Discharge Diet:     Activity at Discharge:     Follow-up Appointments  No future appointments.  Additional  Instructions for the Follow-ups that You Need to Schedule     Discharge Follow-up with PCP   As directed       Currently Documented PCP:    Radha Basilio APRN    PCP Phone Number:    [phone]     Follow Up Details: 5 to 7 days         Discharge Follow-up with Specified Provider: Cardiology; 2 Weeks   As directed      To: Cardiology    Follow Up: 2 Weeks               Test Results Pending at Discharge       Risk for Readmission (LACE) Score: 1 (2/16/2022  6:01 AM)          Marcel Rowe PA-C  02/16/22  13:43 EST

## 2022-02-16 NOTE — NURSING NOTE
Pt c/o feeling anxious. VS WNL, pt states anxiety is improving, sitting up in bed with CPAP off at this time.

## 2022-02-17 ENCOUNTER — READMISSION MANAGEMENT (OUTPATIENT)
Dept: CALL CENTER | Facility: HOSPITAL | Age: 62
End: 2022-02-17

## 2022-02-17 NOTE — OUTREACH NOTE
COVID-19 Week 1 Survey      Responses   [facility] patient discharged from? [facility]   Does the patient have one of the following disease processes/diagnoses(primary or secondary)? COVID-19   COVID-19 underlying condition? None   Call Number Call 1   Week 1 Call successful? Yes   Call start time [phone]   Call end time [phone]   Meds reviewed with patient/caregiver? Yes   Is the patient having any side effects they believe may be caused by any medication additions or changes? No   Does the patient have all medications ordered at discharge? Yes   Is the patient taking all medications as directed (includes completed medication regime)? Yes   Comments regarding appointments States her PCP will have to refer her to a cardiologist.   Does the patient have a primary care provider?  Yes   Does the patient have an appointment with their PCP or specialist within 7 days of discharge? No   What is preventing the patient from scheduling follow up appointments within 7 days of discharge? Haven't had time   Nursing Interventions Advised patient to make appointment,  Educated patient on importance of making appointment   Has the patient kept scheduled appointments due by today? N/A   Has home health visited the patient within 72 hours of discharge? N/A   Psychosocial issues? Yes   Psychosocial comments States suffers from anxiety/depression-states  recovering from hospital stay with covid. Also has handicapped son who lives independently who is recovering from covid dx 4 days ago.   Did the patient receive a copy of their discharge instructions? Yes   Did the patient receive a copy of COVID-19 specific instructions? Yes   Nursing interventions Reviewed instructions with patient   What is the patient's perception of their health status since discharge? Same   Does the patient have any of the following symptoms? None   Nursing Interventions Nurse provided patient education   Pulse Ox monitoring None   Is the patient/caregiver  able to teach back steps to recovery at home? Set small, achievable goals for return to baseline health,  Eat a well-balance diet,  Rest and rebuild strength, gradually increase activity   Is the patient/caregiver able to teach back the hierarchy of who to call/visit for symptoms/problems? PCP, Specialist, Home health nurse, Urgent Care, ED, 911 Yes   COVID-19 call completed? Yes   Wrap up additional comments Patient states no covid s/s. States had covid s/s starting 01/28/22 that have resolved. States this admission was due to anxiety brought on by  and son's covid illnesses. States will begin taking Lorazepam today. Denies any needs at this time.           Ginny Guzman RN

## 2022-02-17 NOTE — OUTREACH NOTE
Prep Survey      Responses   [facility] patient discharged from? [facility]   Is LACE score < 7 ? Yes   Emergency Room discharge w/ pulse ox? No   Eligibility Readm Mgmt   Discharge diagnosis Chest pain, anxiety and covid   Does the patient have one of the following disease processes/diagnoses(primary or secondary)? COVID-19   Does the patient have Home health ordered? No   Is there a DME ordered? No   Prep survey completed? Yes          Serena Callahan RN

## 2022-02-18 ENCOUNTER — READMISSION MANAGEMENT (OUTPATIENT)
Dept: CALL CENTER | Facility: HOSPITAL | Age: 62
End: 2022-02-18

## 2022-02-18 NOTE — OUTREACH NOTE
COVID-19 Week 1 Survey      Responses   [facility] [place] patient discharged from? [facility]   Does the patient have one of the following disease processes/diagnoses(primary or secondary)? COVID-19   COVID-19 underlying condition? None   Call Number Call 2   Week 1 Call successful? Yes   Call start time 1139   Call end time 1142   Discharge diagnosis Chest pain, anxiety and covid   Is the patient taking all medications as directed (includes completed medication regime)? Yes   Does the patient have a primary care provider?  Yes   Comments regarding PCP f/u with PCP on 2/28   Has the patient kept scheduled appointments due by today? N/A   Has home health visited the patient within 72 hours of discharge? N/A   What is the patient's perception of their health status since discharge? Improving   Does the patient have any of the following symptoms? None   Nursing Interventions Nurse provided patient education   Pulse Ox monitoring None   Is the patient/caregiver able to teach back steps to recovery at home? Rest and rebuild strength, gradually increase activity,  Set small, achievable goals for return to baseline health   Is the patient/caregiver able to teach back the hierarchy of who to call/visit for symptoms/problems? PCP, Specialist, Home health nurse, Urgent Care, ED, 911 Yes   COVID-19 call completed? Yes   Wrap up additional comments Says she is doing some better today, no questions at this time.          Ni Hernandez RN

## 2022-02-19 ENCOUNTER — READMISSION MANAGEMENT (OUTPATIENT)
Dept: CALL CENTER | Facility: HOSPITAL | Age: 62
End: 2022-02-19

## 2022-02-19 NOTE — OUTREACH NOTE
COVID-19 Week 1 Survey      Responses   [facility] patient discharged from? [facility]   Does the patient have one of the following disease processes/diagnoses(primary or secondary)? COVID-19   COVID-19 underlying condition? None   Call Number Call 3   Week 1 Call successful? Yes   Call start time 0840   Call end time 0845   Discharge diagnosis Chest pain, anxiety and covid   Meds reviewed with patient/caregiver? Yes   Psychosocial issues? No   What is the patient's perception of their health status since discharge? Improving   Does the patient have any of the following symptoms? None   Pulse Ox monitoring Intermittent   Pulse Ox device source Patient   O2 Sat comments 97-99%   O2 Sat: education provided Sat levels   Is the patient/caregiver able to teach back steps to recovery at home? Set small, achievable goals for return to baseline health,  Rest and rebuild strength, gradually increase activity   COVID-19 call completed? Yes   Wrap up additional comments Still have anxiety from time to time but doing better           Bernie Salas, RN

## 2022-02-22 ENCOUNTER — READMISSION MANAGEMENT (OUTPATIENT)
Dept: CALL CENTER | Facility: HOSPITAL | Age: 62
End: 2022-02-22

## 2022-02-22 NOTE — OUTREACH NOTE
COVID-19 Week 2 Survey      Responses   [facility] patient discharged from? [facility]   Does the patient have one of the following disease processes/diagnoses(primary or secondary)? COVID-19   COVID-19 underlying condition? None   Call Number Call 1   COVID-19 Week 2: Call 1 attempt successful? Yes   Call start time 1616   Call end time 1619   Discharge diagnosis Chest pain, anxiety and covid   Meds reviewed with patient/caregiver? Yes   Is the patient having any side effects they believe may be caused by any medication additions or changes? No   Does the patient have all medications ordered at discharge? Yes   Is the patient taking all medications as directed (includes completed medication regime)? Yes   Does the patient have a primary care provider?  Yes   Comments regarding PCP PCP APPOINTMENT IS 2/28/22   Does the patient have an appointment with their PCP or specialist within 7 days of discharge? Greater than 7 days   What is preventing the patient from scheduling follow up appointments within 7 days of discharge? Earlier appointment not available   Nursing Interventions Verified appointment date/time/provider   Has the patient kept scheduled appointments due by today? N/A   Has home health visited the patient within 72 hours of discharge? N/A   Psychosocial issues? No   Psychosocial comments PATIENT VOICES THAT SHE IS STILL BATTLING ANXIETY, BUT IS TAKING HER ATIVAN AS NEEDED.    Did the patient receive a copy of their discharge instructions? Yes   Did the patient receive a copy of COVID-19 specific instructions? Yes   Nursing interventions Reviewed instructions with patient   What is the patient's perception of their health status since discharge? Improving   Does the patient have any of the following symptoms? None   Nursing Interventions Nurse provided patient education   Pulse Ox monitoring Intermittent   Pulse Ox device source Patient   O2 Sat: education provided Sat levels,  Monitoring frequency   Is  the patient/caregiver able to teach back steps to recovery at home? Set small, achievable goals for return to baseline health,  Rest and rebuild strength, gradually increase activity,  Make a list of questions for provider's appointment,  Eat a well-balance diet,  Practice good oral hygiene   Is the patient/caregiver able to teach back the hierarchy of who to call/visit for symptoms/problems? PCP, Specialist, Home health nurse, Urgent Care, ED, 911 Yes   COVID-19 call completed? Yes          Sparkle Da Silva LPN

## 2022-08-15 ENCOUNTER — HOSPITAL ENCOUNTER (OUTPATIENT)
Dept: CARDIOLOGY | Facility: HOSPITAL | Age: 62
Discharge: HOME | End: 2022-08-15

## 2022-08-15 ENCOUNTER — LAB (OUTPATIENT)
Dept: LAB | Facility: HOSPITAL | Age: 62
End: 2022-08-15

## 2022-08-15 ENCOUNTER — TRANSCRIBE ORDERS (OUTPATIENT)
Dept: ADMINISTRATIVE | Facility: HOSPITAL | Age: 62
End: 2022-08-15

## 2022-08-15 DIAGNOSIS — N39.3 FEMALE STRESS INCONTINENCE: ICD-10-CM

## 2022-08-15 DIAGNOSIS — Z01.818 PREOP TESTING: ICD-10-CM

## 2022-08-15 DIAGNOSIS — N39.3 FEMALE STRESS INCONTINENCE: Primary | ICD-10-CM

## 2022-08-15 LAB
ANION GAP SERPL CALCULATED.3IONS-SCNC: 14.6 MMOL/L (ref 5–15)
BASOPHILS # BLD AUTO: 0.07 10*3/MM3 (ref 0–0.2)
BASOPHILS NFR BLD AUTO: 0.9 % (ref 0–1.5)
BUN SERPL-MCNC: 17 MG/DL (ref 8–23)
BUN/CREAT SERPL: 22.7 (ref 7–25)
CALCIUM SPEC-SCNC: 9.3 MG/DL (ref 8.6–10.5)
CHLORIDE SERPL-SCNC: 99 MMOL/L (ref 98–107)
CO2 SERPL-SCNC: 26.4 MMOL/L (ref 22–29)
CREAT SERPL-MCNC: 0.75 MG/DL (ref 0.57–1)
DEPRECATED RDW RBC AUTO: 41.3 FL (ref 37–54)
EGFRCR SERPLBLD CKD-EPI 2021: 91.3 ML/MIN/1.73
EOSINOPHIL # BLD AUTO: 0.24 10*3/MM3 (ref 0–0.4)
EOSINOPHIL NFR BLD AUTO: 3 % (ref 0.3–6.2)
ERYTHROCYTE [DISTWIDTH] IN BLOOD BY AUTOMATED COUNT: 12.6 % (ref 12.3–15.4)
GLUCOSE SERPL-MCNC: 102 MG/DL (ref 65–99)
HCT VFR BLD AUTO: 36.6 % (ref 34–46.6)
HGB BLD-MCNC: 12 G/DL (ref 12–15.9)
IMM GRANULOCYTES # BLD AUTO: 0.03 10*3/MM3 (ref 0–0.05)
IMM GRANULOCYTES NFR BLD AUTO: 0.4 % (ref 0–0.5)
LYMPHOCYTES # BLD AUTO: 2.4 10*3/MM3 (ref 0.7–3.1)
LYMPHOCYTES NFR BLD AUTO: 30.5 % (ref 19.6–45.3)
MCH RBC QN AUTO: 29.9 PG (ref 26.6–33)
MCHC RBC AUTO-ENTMCNC: 32.8 G/DL (ref 31.5–35.7)
MCV RBC AUTO: 91.3 FL (ref 79–97)
MONOCYTES # BLD AUTO: 0.57 10*3/MM3 (ref 0.1–0.9)
MONOCYTES NFR BLD AUTO: 7.2 % (ref 5–12)
NEUTROPHILS NFR BLD AUTO: 4.56 10*3/MM3 (ref 1.7–7)
NEUTROPHILS NFR BLD AUTO: 58 % (ref 42.7–76)
NRBC BLD AUTO-RTO: 0 /100 WBC (ref 0–0.2)
PLATELET # BLD AUTO: 282 10*3/MM3 (ref 140–450)
PMV BLD AUTO: 10.8 FL (ref 6–12)
POTASSIUM SERPL-SCNC: 3.9 MMOL/L (ref 3.5–5.2)
QT INTERVAL: 419 MS
RBC # BLD AUTO: 4.01 10*6/MM3 (ref 3.77–5.28)
SODIUM SERPL-SCNC: 140 MMOL/L (ref 136–145)
WBC NRBC COR # BLD: 7.87 10*3/MM3 (ref 3.4–10.8)

## 2022-08-15 PROCEDURE — 80048 BASIC METABOLIC PNL TOTAL CA: CPT

## 2022-08-15 PROCEDURE — 93010 ELECTROCARDIOGRAM REPORT: CPT | Performed by: INTERNAL MEDICINE

## 2022-08-15 PROCEDURE — 85025 COMPLETE CBC W/AUTO DIFF WBC: CPT

## 2022-08-15 PROCEDURE — 93005 ELECTROCARDIOGRAM TRACING: CPT | Performed by: UROLOGY

## 2022-08-15 PROCEDURE — 36415 COLL VENOUS BLD VENIPUNCTURE: CPT
